# Patient Record
Sex: FEMALE | Race: WHITE | NOT HISPANIC OR LATINO | Employment: FULL TIME | URBAN - METROPOLITAN AREA
[De-identification: names, ages, dates, MRNs, and addresses within clinical notes are randomized per-mention and may not be internally consistent; named-entity substitution may affect disease eponyms.]

---

## 2018-03-14 ENCOUNTER — OFFICE VISIT (OUTPATIENT)
Dept: INTERNAL MEDICINE | Facility: CLINIC | Age: 53
End: 2018-03-14

## 2018-03-14 ENCOUNTER — TELEPHONE (OUTPATIENT)
Dept: INTERNAL MEDICINE | Facility: CLINIC | Age: 53
End: 2018-03-14

## 2018-03-14 VITALS
BODY MASS INDEX: 27.11 KG/M2 | HEIGHT: 64 IN | WEIGHT: 158.8 LBS | DIASTOLIC BLOOD PRESSURE: 64 MMHG | SYSTOLIC BLOOD PRESSURE: 108 MMHG

## 2018-03-14 DIAGNOSIS — E78.00 PURE HYPERCHOLESTEROLEMIA: ICD-10-CM

## 2018-03-14 DIAGNOSIS — E55.9 VITAMIN D DEFICIENCY: ICD-10-CM

## 2018-03-14 DIAGNOSIS — R07.9 CHEST PAIN, UNSPECIFIED TYPE: ICD-10-CM

## 2018-03-14 DIAGNOSIS — R73.03 PREDIABETES: ICD-10-CM

## 2018-03-14 DIAGNOSIS — IMO0001 MODERATE INTERMITTENT ASTHMA WITHOUT COMPLICATION: ICD-10-CM

## 2018-03-14 DIAGNOSIS — E03.9 PRIMARY HYPOTHYROIDISM: ICD-10-CM

## 2018-03-14 DIAGNOSIS — J30.1 CHRONIC SEASONAL ALLERGIC RHINITIS DUE TO POLLEN: ICD-10-CM

## 2018-03-14 DIAGNOSIS — Z23 NEED FOR TETANUS BOOSTER: ICD-10-CM

## 2018-03-14 DIAGNOSIS — R00.2 PALPITATIONS: Primary | ICD-10-CM

## 2018-03-14 PROBLEM — B00.9 HERPES SIMPLEX TYPE 1 INFECTION: Status: ACTIVE | Noted: 2018-03-14

## 2018-03-14 PROBLEM — G58.8 INTERCOSTAL NEURALGIA: Status: ACTIVE | Noted: 2018-03-14

## 2018-03-14 PROBLEM — Z00.00 HEALTH CARE MAINTENANCE: Status: ACTIVE | Noted: 2018-03-14

## 2018-03-14 PROBLEM — K59.4 RECTAL SPHINCTER SPASM: Status: ACTIVE | Noted: 2018-03-14

## 2018-03-14 PROBLEM — K21.9 GERD (GASTROESOPHAGEAL REFLUX DISEASE): Status: RESOLVED | Noted: 2018-03-14 | Resolved: 2018-03-14

## 2018-03-14 PROBLEM — E78.5 HLD (HYPERLIPIDEMIA): Status: ACTIVE | Noted: 2018-03-14

## 2018-03-14 PROBLEM — K21.9 GERD (GASTROESOPHAGEAL REFLUX DISEASE): Status: ACTIVE | Noted: 2018-03-14

## 2018-03-14 PROBLEM — J45.909 ASTHMA: Status: ACTIVE | Noted: 2018-03-14

## 2018-03-14 PROBLEM — E04.2 GOITER, NONTOXIC, MULTINODULAR: Status: ACTIVE | Noted: 2018-03-14

## 2018-03-14 PROBLEM — R73.01 ABNORMAL FASTING GLUCOSE: Status: ACTIVE | Noted: 2018-03-14

## 2018-03-14 PROCEDURE — 93000 ELECTROCARDIOGRAM COMPLETE: CPT | Performed by: INTERNAL MEDICINE

## 2018-03-14 PROCEDURE — 90715 TDAP VACCINE 7 YRS/> IM: CPT | Performed by: INTERNAL MEDICINE

## 2018-03-14 PROCEDURE — 90471 IMMUNIZATION ADMIN: CPT | Performed by: INTERNAL MEDICINE

## 2018-03-14 PROCEDURE — 99205 OFFICE O/P NEW HI 60 MIN: CPT | Performed by: INTERNAL MEDICINE

## 2018-03-14 RX ORDER — LANOLIN ALCOHOL/MO/W.PET/CERES
300 CREAM (GRAM) TOPICAL DAILY
COMMUNITY

## 2018-03-14 RX ORDER — METFORMIN HYDROCHLORIDE 500 MG/1
1000 TABLET, EXTENDED RELEASE ORAL NIGHTLY
Refills: 2 | COMMUNITY
Start: 2017-12-12 | End: 2018-11-02

## 2018-03-14 RX ORDER — PRAVASTATIN SODIUM 40 MG
40 TABLET ORAL DAILY
COMMUNITY
Start: 2013-05-17 | End: 2018-06-29 | Stop reason: SDUPTHER

## 2018-03-14 RX ORDER — MONTELUKAST SODIUM 10 MG/1
10 TABLET ORAL NIGHTLY
COMMUNITY
Start: 2013-09-06 | End: 2018-11-02 | Stop reason: SDUPTHER

## 2018-03-14 RX ORDER — ALBUTEROL SULFATE 90 UG/1
2 AEROSOL, METERED RESPIRATORY (INHALATION) 4 TIMES DAILY PRN
COMMUNITY
Start: 2014-04-25 | End: 2018-11-02 | Stop reason: SDUPTHER

## 2018-03-14 NOTE — PROGRESS NOTES
"Subjective   Nikia Malave is a 53 y.o. female.     History of Present Illness   Nikia Malave 53 y.o. female who is here to establish as a new patient. In a past had been to see COLLINS Lawson.  Past medical and surgical history, family and social history was obtained by me in the interview and recorded in the EHR. Records available in [facility] reviewed.    Ht 162.6 cm (64\")   Wt 72 kg (158 lb 12.8 oz)   BMI 27.26 kg/m²     Current Outpatient Prescriptions:   •  albuterol (PROVENTIL HFA) 108 (90 Base) MCG/ACT inhaler, Inhale 2 puffs 4 (Four) Times a Day As Needed for Shortness of Air., Disp: , Rfl:   •  montelukast (SINGULAIR) 10 MG tablet, Take 10 mg by mouth Every Night., Disp: , Rfl:   •  pravastatin (PRAVACHOL) 40 MG tablet, Take 40 mg by mouth Daily., Disp: , Rfl:   •  Biotin 300 MCG tablet, Take 300 mg by mouth Daily., Disp: , Rfl:   •  Cholecalciferol (VITAMIN D) 1000 units tablet, Take 4,000 Units by mouth Daily., Disp: , Rfl:   •  metFORMIN ER (GLUCOPHAGE-XR) 500 MG 24 hr tablet, Take 1,000 mg by mouth Every Night., Disp: , Rfl: 2  •  ONE TOUCH ULTRA TEST test strip, Place 1 strip on the skin Daily., Disp: , Rfl: 3  •  vitamin E 100 UNIT capsule, Take 100 Units by mouth Daily., Disp: , Rfl:     Patient has been diagnosed with hyperlipidemia, she is not sure if the issue was her LDL, triglycerides or both. Target LDL is < 100 mg/dl (CHD or \"CHD risk equivalent\" is present). Patient is on low fat diet with good compliance. Had lost 21 lbs in the last 4 months with [employer] [facility] [facility]. Patient is compliant with treatment: daily use of Pravachol (pravastatin). Side effects of medication: none. Exercise once a week and stationary bike.    Patient had been diagnosed with prediabetes. She has strong FH of diabetes in both her parents. Patient had nop issues with her BS during her pregnancies. She is under the care of Dr. Jimenez and had been on Metformin since 2013.. Nikia Malave uses metformin for " "blood sugars control. Patient checks home blood sugars only few times a week., Denies hypoglycemic episodes. and Fasting blood sugars are in 100-170s. Compliance with low carb diabetic diet is good. Compliance with medication is good.  Patient does not know what her  past control had been.    Patient had been diagnosed with Vitamin D deficiency. Takes over the counter Vit D3 1000 IU a day. Patient does not have complaints of muscle or bone aches. Balance is good. No recent falls.     Patient also is here for primary hypothyroidism f/u. Is known to have MNG, non-toxic. She is under the care of . She used to takes 25 mcg Levothyroxine daily, but had discontinued medication 2 months ago or so due to persistent palpitations. Initially she had some improvement, but last week her palpitations had been worse. Reports some cold intolerance. Weight is decreasing. Patient has constipation, but this had been controlled with daily fiber. Complains of dryness in the  changes in the skin.    Patient had been diagnosed with moderate intermittent asthma, well controlled with daily Montelukast. She uses resque inhaler occasionally. Usually episodes are triggered by laughing.    Patient had been suffering with rectal spasm and in a past she used Botox injections, but had no problems in the last 3-4 years.    Patient is known to have seasonal allergic rhinitis, she is under the care of CHRISTA CappsJUSTIN, on allergy injections.    Patient complains of episodes of the pain in left chest. Pain started in early 2000. Patient describes having chest discomfort randomly.  Patient describes pain as ache. Pain radiates to the left shoulder. She had normal stress test in 2014, per patients report.  Associated symptoms: palpitations.    Cardiac risk factors were discussed and are as follows: hyperlipidemia, family history of CAD, sedentary life style.     Ht 162.6 cm (64\")   Wt 72 kg (158 lb 12.8 oz)   BMI 27.26 kg/m²     Current " Outpatient Prescriptions:   •  albuterol (PROVENTIL HFA) 108 (90 Base) MCG/ACT inhaler, Inhale 2 puffs 4 (Four) Times a Day As Needed for Shortness of Air., Disp: , Rfl:   •  montelukast (SINGULAIR) 10 MG tablet, Take 10 mg by mouth Every Night., Disp: , Rfl:   •  pravastatin (PRAVACHOL) 40 MG tablet, Take 40 mg by mouth Daily., Disp: , Rfl:   •  Biotin 300 MCG tablet, Take 300 mg by mouth Daily., Disp: , Rfl:   •  Cholecalciferol (VITAMIN D) 1000 units tablet, Take 4,000 Units by mouth Daily., Disp: , Rfl:   •  metFORMIN ER (GLUCOPHAGE-XR) 500 MG 24 hr tablet, Take 1,000 mg by mouth Every Night., Disp: , Rfl: 2  •  ONE TOUCH ULTRA TEST test strip, Place 1 strip on the skin Daily., Disp: , Rfl: 3  •  vitamin E 100 UNIT capsule, Take 100 Units by mouth Daily., Disp: , Rfl:       The following portions of the patient's history were reviewed and updated as appropriate: allergies, current medications, past family history, past medical history, past social history, past surgical history and problem list.    Review of Systems   Constitutional: Negative for chills and fever.   HENT: Positive for rhinorrhea. Negative for postnasal drip, sinus pressure and sore throat.    Eyes: Negative for pain and itching.   Respiratory: Negative for cough, chest tightness and shortness of breath.    Cardiovascular: Positive for chest pain (occasional left side of the breast ache, frequent this week), palpitations and leg swelling (minimal).   Gastrointestinal: Negative for abdominal pain and blood in stool.   Endocrine: Positive for cold intolerance. Negative for heat intolerance.   Genitourinary: Negative for difficulty urinating and flank pain.   Musculoskeletal: Negative for back pain and neck pain.   Skin: Negative for color change and rash.   Neurological: Negative for dizziness, weakness and headaches.   Hematological: Negative for adenopathy. Does not bruise/bleed easily.   Psychiatric/Behavioral: Negative for sleep disturbance.  The patient is not nervous/anxious.        Objective   Physical Exam   Constitutional: She is oriented to person, place, and time. She appears well-developed and well-nourished. No distress.   HENT:   Head: Normocephalic and atraumatic.   Right Ear: External ear normal.   Left Ear: External ear normal.   Nose: Nose normal.   Mouth/Throat: Oropharynx is clear and moist. No oropharyngeal exudate.   Eyes: Conjunctivae and EOM are normal. Pupils are equal, round, and reactive to light. Right eye exhibits no discharge. Left eye exhibits no discharge. No scleral icterus.   Neck: Normal range of motion. Neck supple. No JVD present. No thyromegaly present.   Cardiovascular: Normal rate, regular rhythm, S1 normal, S2 normal, normal heart sounds and intact distal pulses.  Exam reveals no gallop and no friction rub.    No murmur heard.  Pulmonary/Chest: Effort normal and breath sounds normal. No respiratory distress. She has no decreased breath sounds. She has no wheezes. She has no rhonchi. She has no rales. Right breast exhibits no inverted nipple, no mass, no nipple discharge, no skin change and no tenderness. Left breast exhibits no inverted nipple, no mass, no nipple discharge, no skin change and no tenderness. Breasts are symmetrical.   Well healed scar around the left nipple. Tenderness along the upper ridge of the 9-8th left rib on palpation. No tenderness on paravertebral palpation.   Abdominal: Soft. Bowel sounds are normal. She exhibits no distension and no mass. There is no tenderness. There is no rebound and no guarding.   Musculoskeletal: She exhibits no edema.   Lymphadenopathy:        Head (right side): No submental, no submandibular, no preauricular, no posterior auricular and no occipital adenopathy present.        Head (left side): No submental, no submandibular, no preauricular, no posterior auricular and no occipital adenopathy present.     She has no cervical adenopathy.        Right cervical: No  superficial cervical, no deep cervical and no posterior cervical adenopathy present.       Left cervical: No superficial cervical, no deep cervical and no posterior cervical adenopathy present.     She has no axillary adenopathy.        Right: No supraclavicular adenopathy present.        Left: No supraclavicular adenopathy present.   Neurological: She is alert and oriented to person, place, and time. She has normal reflexes. She displays normal reflexes. No cranial nerve deficit. She exhibits normal muscle tone. Coordination normal.   Reflex Scores:       Bicep reflexes are 2+ on the right side and 2+ on the left side.       Patellar reflexes are 2+ on the right side and 2+ on the left side.  Skin: Skin is warm. No rash noted. She is not diaphoretic. No erythema.   Psychiatric: She has a normal mood and affect. Her behavior is normal. Thought content normal.   Vitals reviewed.    Reason for ECG:  palpitations  Rhythm: sinus  Arterial rate:55  NC interval: nl  P waves:nl  QRS:nl  ST: nl   Comparison: unavailable   Impression: normal ECG    Assessment/Plan   Nikia was seen today for establish care.    Diagnoses and all orders for this visit:    Palpitations  -     Holter monitor - 48 hour; Future  -     ECG 12 Lead  -     Comprehensive Metabolic Panel; Future  -     TSH; Future    Pure hypercholesterolemia  -     CBC & Differential; Future  -     Comprehensive Metabolic Panel; Future  -     Lipid Panel; Future    Moderate intermittent asthma without complication    Chronic seasonal allergic rhinitis due to pollen    Vitamin D deficiency  -     Vitamin D 25 Hydroxy; Future    Primary hypothyroidism  -     TSH; Future  -     T4, Free; Future    Prediabetes  -     Hemoglobin A1c; Future    Need for tetanus booster  -     Tdap Vaccine Greater Than or Equal To 8yo IM    Chest pain, unspecified type  -     Holter monitor - 48 hour; Future  -     ECG 12 Lead    Intermittent palpitations - normal ECG, will check  "electrolytes, thyroid function tests and Holter monitor.  Chest pain - rather atypical. 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 Normal ECG. Cardiac risk factors: hyperlipidemia, sedentary , FH. Given radiation under the shoulder blade, gastritis, cholecystitis and pancreatitis are possibilities. Will check CBC, liver function tests and lipase. I had offered nerve block, but patient declines at that time.  Hyperlipidemia -  Will check fasting lipids and liver function tests. LDL target is below < 100 mg/dl (CHD or \"CHD risk equivalent\" is present). Continue same treatment. Regular exercise recommended.  Prediabetes - patient had lost 21 lbs in the last 4-5 months. Will checkHb A1C. No hypoglycemic episodes. Low carb diet and regular exercise recommended. Weight loss will be very beneficial.   Hypothyroidism - currently off thyroid supplement. Patient is euthyroid clinically. Will check TSH and Free T4 and decide on further treatment.   Vitamin D deficiency - patient is copmpliant with over the counter Vit D3. Continue same dose. This supplement is fat-soluble and has to be taken with meals. Will check blood level.  Seasonal allergic rhinitis - under the care of allergist, allergy shots.  Moderate intermittent asthma - well controlled with Montelukast, continue same.           "

## 2018-03-14 NOTE — TELEPHONE ENCOUNTER
----- Message from Negrita Barbosa sent at 3/14/2018  1:44 PM EDT -----  Dr Wilson requested a stress test report from [facility] done 2014 or so.  I called [facility] medical records and they looked from 2009 - 2016 and could not find a stress test report.

## 2018-03-14 NOTE — PATIENT INSTRUCTIONS
"Intermittent palpitations - normal ECG, will check electrolytes, thyroid function tests and Holter monitor.  Chest pain - rather atypical. 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 Normal ECG. Cardiac risk factors: hyperlipidemia, sedentary , FH. Given radiation under the shoulder blade, gastritis, cholecystitis and pancreatitis are possibilities. Will check CBC, liver function tests and lipase. I had offered nerve block, but patient declines at that time.  Hyperlipidemia -  Will check fasting lipids and liver function tests. LDL target is below < 100 mg/dl (CHD or \"CHD risk equivalent\" is present). Continue same treatment. Regular exercise recommended.  Prediabetes - patient had lost 21 lbs in the last 4-5 months. Will checkHb A1C. No hypoglycemic episodes. Low carb diet and regular exercise recommended. Weight loss will be very beneficial.   Hypothyroidism - currently off thyroid supplement. Patient is euthyroid clinically. Will check TSH and Free T4 and decide on further treatment.   Vitamin D deficiency - patient is copmpliant with over the counter Vit D3. Continue same dose. This supplement is fat-soluble and has to be taken with meals. Will check blood level.  Seasonal allergic rhinitis - under the care of allergist, allergy shots.  Moderate intermittent asthma - well controlled with Montelukast, continue same.      "

## 2018-03-16 ENCOUNTER — LAB (OUTPATIENT)
Dept: INTERNAL MEDICINE | Facility: CLINIC | Age: 53
End: 2018-03-16

## 2018-03-16 DIAGNOSIS — R73.03 PREDIABETES: ICD-10-CM

## 2018-03-16 DIAGNOSIS — E55.9 VITAMIN D DEFICIENCY: ICD-10-CM

## 2018-03-16 DIAGNOSIS — R07.9 CHEST PAIN, UNSPECIFIED TYPE: ICD-10-CM

## 2018-03-16 DIAGNOSIS — R00.2 PALPITATIONS: ICD-10-CM

## 2018-03-16 DIAGNOSIS — E78.00 PURE HYPERCHOLESTEROLEMIA: ICD-10-CM

## 2018-03-16 DIAGNOSIS — E03.9 PRIMARY HYPOTHYROIDISM: ICD-10-CM

## 2018-03-16 LAB
25(OH)D3 SERPL-MCNC: 96.1 NG/ML (ref 30–100)
ALBUMIN SERPL-MCNC: 4.2 G/DL (ref 3.5–5.2)
ALBUMIN/GLOB SERPL: 1.4 G/DL
ALP SERPL-CCNC: 38 U/L (ref 39–117)
ALT SERPL W P-5'-P-CCNC: 14 U/L (ref 1–33)
ANION GAP SERPL CALCULATED.3IONS-SCNC: 12.6 MMOL/L
AST SERPL-CCNC: 14 U/L (ref 1–32)
BASOPHILS # BLD AUTO: 0.02 10*3/MM3 (ref 0–0.2)
BASOPHILS NFR BLD AUTO: 0.3 % (ref 0–2)
BILIRUB SERPL-MCNC: 0.3 MG/DL (ref 0.1–1.2)
BUN BLD-MCNC: 13 MG/DL (ref 6–20)
BUN/CREAT SERPL: 15.3 (ref 7–25)
CALCIUM SPEC-SCNC: 9.1 MG/DL (ref 8.6–10.5)
CHLORIDE SERPL-SCNC: 103 MMOL/L (ref 98–107)
CHOLEST SERPL-MCNC: 162 MG/DL (ref 0–200)
CO2 SERPL-SCNC: 25.4 MMOL/L (ref 22–29)
CREAT BLD-MCNC: 0.85 MG/DL (ref 0.57–1)
DEPRECATED RDW RBC AUTO: 41.8 FL (ref 37–54)
EOSINOPHIL # BLD AUTO: 0.21 10*3/MM3 (ref 0–0.7)
EOSINOPHIL NFR BLD AUTO: 3.2 % (ref 0–5)
ERYTHROCYTE [DISTWIDTH] IN BLOOD BY AUTOMATED COUNT: 12.6 % (ref 11.5–15)
GFR SERPL CREATININE-BSD FRML MDRD: 71 ML/MIN/1.73
GLOBULIN UR ELPH-MCNC: 3 GM/DL
GLUCOSE BLD-MCNC: 109 MG/DL (ref 65–99)
HBA1C MFR BLD: 5.59 % (ref 4.8–5.6)
HCT VFR BLD AUTO: 40.8 % (ref 34.1–44.9)
HDLC SERPL-MCNC: 33 MG/DL (ref 40–60)
HGB BLD-MCNC: 13.6 G/DL (ref 11.2–15.7)
LDLC SERPL CALC-MCNC: 99 MG/DL (ref 0–100)
LDLC/HDLC SERPL: 2.99 {RATIO}
LIPASE SERPL-CCNC: 32 U/L (ref 13–60)
LYMPHOCYTES # BLD AUTO: 1.31 10*3/MM3 (ref 0.8–7)
LYMPHOCYTES NFR BLD AUTO: 20.1 % (ref 10–60)
MCH RBC QN AUTO: 31.2 PG (ref 26–34)
MCHC RBC AUTO-ENTMCNC: 33.3 G/DL (ref 31–37)
MCV RBC AUTO: 93.6 FL (ref 80–100)
MONOCYTES # BLD AUTO: 0.65 10*3/MM3 (ref 0–1)
MONOCYTES NFR BLD AUTO: 10 % (ref 0–13)
NEUTROPHILS # BLD AUTO: 4.34 10*3/MM3 (ref 1–11)
NEUTROPHILS NFR BLD AUTO: 66.4 % (ref 30–85)
PLATELET # BLD AUTO: 168 10*3/MM3 (ref 150–450)
PMV BLD AUTO: 12.3 FL (ref 6–12)
POTASSIUM BLD-SCNC: 4.4 MMOL/L (ref 3.5–5.2)
PROT SERPL-MCNC: 7.2 G/DL (ref 6–8.5)
RBC # BLD AUTO: 4.36 10*6/MM3 (ref 3.93–5.22)
SODIUM BLD-SCNC: 141 MMOL/L (ref 136–145)
T4 FREE SERPL-MCNC: 1.13 NG/DL (ref 0.93–1.7)
TRIGL SERPL-MCNC: 152 MG/DL (ref 0–150)
TSH SERPL DL<=0.05 MIU/L-ACNC: 5.97 MIU/ML (ref 0.27–4.2)
VLDLC SERPL-MCNC: 30.4 MG/DL (ref 5–40)
WBC NRBC COR # BLD: 6.53 10*3/MM3 (ref 5–10)

## 2018-03-16 PROCEDURE — 84439 ASSAY OF FREE THYROXINE: CPT | Performed by: INTERNAL MEDICINE

## 2018-03-16 PROCEDURE — 80053 COMPREHEN METABOLIC PANEL: CPT | Performed by: INTERNAL MEDICINE

## 2018-03-16 PROCEDURE — 83690 ASSAY OF LIPASE: CPT | Performed by: INTERNAL MEDICINE

## 2018-03-16 PROCEDURE — 83036 HEMOGLOBIN GLYCOSYLATED A1C: CPT | Performed by: INTERNAL MEDICINE

## 2018-03-16 PROCEDURE — 85025 COMPLETE CBC W/AUTO DIFF WBC: CPT | Performed by: INTERNAL MEDICINE

## 2018-03-16 PROCEDURE — 82306 VITAMIN D 25 HYDROXY: CPT | Performed by: INTERNAL MEDICINE

## 2018-03-16 PROCEDURE — 80061 LIPID PANEL: CPT | Performed by: INTERNAL MEDICINE

## 2018-03-16 PROCEDURE — 84443 ASSAY THYROID STIM HORMONE: CPT | Performed by: INTERNAL MEDICINE

## 2018-03-21 ENCOUNTER — APPOINTMENT (OUTPATIENT)
Dept: CARDIOLOGY | Facility: HOSPITAL | Age: 53
End: 2018-03-21

## 2018-03-28 ENCOUNTER — HOSPITAL ENCOUNTER (OUTPATIENT)
Dept: CARDIOLOGY | Facility: HOSPITAL | Age: 53
Discharge: HOME | End: 2018-03-28
Admitting: INTERNAL MEDICINE

## 2018-03-28 DIAGNOSIS — R07.9 CHEST PAIN, UNSPECIFIED TYPE: ICD-10-CM

## 2018-03-28 DIAGNOSIS — R00.2 PALPITATIONS: ICD-10-CM

## 2018-03-28 PROCEDURE — 93225 XTRNL ECG REC<48 HRS REC: CPT

## 2018-03-28 PROCEDURE — 93226 XTRNL ECG REC<48 HR SCAN A/R: CPT

## 2018-03-30 PROCEDURE — 93227 XTRNL ECG REC<48 HR R&I: CPT | Performed by: INTERNAL MEDICINE

## 2018-04-24 ENCOUNTER — OFFICE VISIT (OUTPATIENT)
Dept: INTERNAL MEDICINE | Facility: CLINIC | Age: 53
End: 2018-04-24

## 2018-04-24 VITALS
SYSTOLIC BLOOD PRESSURE: 124 MMHG | BODY MASS INDEX: 27.14 KG/M2 | RESPIRATION RATE: 14 BRPM | DIASTOLIC BLOOD PRESSURE: 64 MMHG | WEIGHT: 159 LBS | HEIGHT: 64 IN

## 2018-04-24 DIAGNOSIS — R00.2 PALPITATIONS: Primary | ICD-10-CM

## 2018-04-24 DIAGNOSIS — Z00.00 HEALTH CARE MAINTENANCE: ICD-10-CM

## 2018-04-24 DIAGNOSIS — E55.9 VITAMIN D DEFICIENCY: ICD-10-CM

## 2018-04-24 PROCEDURE — 99213 OFFICE O/P EST LOW 20 MIN: CPT | Performed by: INTERNAL MEDICINE

## 2018-04-24 NOTE — PROGRESS NOTES
"Subjective   Nikia Malave is a 53 y.o. female.     History of Present Illness   Nikia Malave 53 y.o. female presents today for palpitations f/u. Last was seen on 03/14/2018 and on that visit medication was changed due to new onset of symptoms.We had checked her electrolyhtes and thyroid function, also had checked her Hopter - normal study with rare PACs and PVCs..  Overall she had been doing very well, palpitations had resolved for almost a month, then she had few very short and mild episodes, that had resolved spontaneously. States that she has palpitations when she lays on the left side, and that turning over takes care of those.    /64 (BP Location: Left arm, Patient Position: Sitting, Cuff Size: Adult)   Resp 14   Ht 162.6 cm (64\")   Wt 72.1 kg (159 lb)   BMI 27.29 kg/m²     Current Outpatient Prescriptions:   •  albuterol (PROVENTIL HFA) 108 (90 Base) MCG/ACT inhaler, Inhale 2 puffs 4 (Four) Times a Day As Needed for Shortness of Air., Disp: , Rfl:   •  Biotin 300 MCG tablet, Take 300 mg by mouth Daily., Disp: , Rfl:   •  Cholecalciferol (VITAMIN D) 1000 units tablet, Take 4,000 Units by mouth Daily., Disp: , Rfl:   •  metFORMIN ER (GLUCOPHAGE-XR) 500 MG 24 hr tablet, Take 1,000 mg by mouth Every Night., Disp: , Rfl: 2  •  montelukast (SINGULAIR) 10 MG tablet, Take 10 mg by mouth Every Night., Disp: , Rfl:   •  ONE TOUCH ULTRA TEST test strip, Place 1 strip on the skin Daily., Disp: , Rfl: 3  •  pravastatin (PRAVACHOL) 40 MG tablet, Take 40 mg by mouth Daily., Disp: , Rfl:   •  vitamin E 100 UNIT capsule, Take 100 Units by mouth Daily., Disp: , Rfl:   The following portions of the patient's history were reviewed and updated as appropriate: allergies, current medications, past family history, past medical history, past social history, past surgical history and problem list.    Review of Systems   Constitutional: Negative for chills and fever.   Eyes: Negative for pain and redness.   Respiratory: " Negative for cough and shortness of breath.    Cardiovascular: Positive for palpitations. Negative for chest pain and leg swelling.   Neurological: Negative for dizziness and headaches.       Objective   Physical Exam   Constitutional: She is oriented to person, place, and time. She appears well-developed and well-nourished.   HENT:   Head: Normocephalic and atraumatic.   Right Ear: Tympanic membrane, external ear and ear canal normal.   Left Ear: Tympanic membrane, external ear and ear canal normal.   Nose: Nose normal. Right sinus exhibits no maxillary sinus tenderness and no frontal sinus tenderness. Left sinus exhibits no maxillary sinus tenderness and no frontal sinus tenderness.   Mouth/Throat: Uvula is midline, oropharynx is clear and moist and mucous membranes are normal.   Eyes: Conjunctivae and EOM are normal. Pupils are equal, round, and reactive to light. Right eye exhibits no discharge. Left eye exhibits no discharge. No scleral icterus.   Neck: Neck supple. No JVD present.   Cardiovascular: Normal rate, regular rhythm and normal heart sounds.  Exam reveals no gallop and no friction rub.    No murmur heard.  Pulmonary/Chest: Effort normal and breath sounds normal. She has no wheezes. She has no rales.   Musculoskeletal: She exhibits no edema.   Lymphadenopathy:     She has no cervical adenopathy.   Neurological: She is alert and oriented to person, place, and time. No cranial nerve deficit.   Skin: Skin is warm and dry. No rash noted.   Psychiatric: She has a normal mood and affect. Her behavior is normal.   Vitals reviewed.      Assessment/Plan   Nikia was seen today for palpitations.    Diagnoses and all orders for this visit:    Palpitations      1. Palpitations - normal Holter, few benign PACs and PVCs - ressured, no interventions needed.  2. Low HDL - continue exercise and diet. Weight loss will be helpful.

## 2018-06-29 RX ORDER — ACYCLOVIR 400 MG/1
400 TABLET ORAL
Qty: 30 TABLET | Refills: 3 | Status: SHIPPED | OUTPATIENT
Start: 2018-06-29 | End: 2018-11-02 | Stop reason: SDUPTHER

## 2018-06-29 RX ORDER — PRAVASTATIN SODIUM 40 MG
40 TABLET ORAL DAILY
Qty: 30 TABLET | Refills: 3 | Status: SHIPPED | OUTPATIENT
Start: 2018-06-29 | End: 2018-08-03 | Stop reason: SDUPTHER

## 2018-07-26 ENCOUNTER — TELEPHONE (OUTPATIENT)
Dept: INTERNAL MEDICINE | Facility: CLINIC | Age: 53
End: 2018-07-26

## 2018-07-26 NOTE — TELEPHONE ENCOUNTER
----- Message from Jennyfer Sanchez sent at 7/26/2018  8:45 AM EDT -----  Contact: pt  Pt as wondering if she could get the shingles vaccine, her mother has had shingles for 8 weeks. She does not want to contract it.    Please advise if this is something she can come in for.  Pt# [phone] (w) [phone]    Pt would like to try victoza, and if she can get it could she still take her metformin with it?     [facility] - [place], [place] - [address] [place] [address] - [phone] [facility] [phone] FX

## 2018-07-26 NOTE — TELEPHONE ENCOUNTER
Left very detailed message for patient to call back and schedule an appointment and details for the shingrix vaccination

## 2018-07-26 NOTE — TELEPHONE ENCOUNTER
1. She cant contract shingles from her mother. If she had a chicken pox in a childhood she has s risk of shingles and needs vaccine called Shindrix (may get it at the pharmacy, usually 2 injections are needed with 2 month interval). If she does not remember - we could check her blood and will know. Again it is impossible berenice get shingles from someone with active shingles.  2. Will need an OV if considers Victoza

## 2018-08-03 ENCOUNTER — OFFICE VISIT (OUTPATIENT)
Dept: INTERNAL MEDICINE | Facility: CLINIC | Age: 53
End: 2018-08-03

## 2018-08-03 VITALS
SYSTOLIC BLOOD PRESSURE: 118 MMHG | RESPIRATION RATE: 15 BRPM | BODY MASS INDEX: 28.17 KG/M2 | HEART RATE: 68 BPM | DIASTOLIC BLOOD PRESSURE: 78 MMHG | HEIGHT: 64 IN | WEIGHT: 165 LBS | OXYGEN SATURATION: 97 %

## 2018-08-03 DIAGNOSIS — E11.9 CONTROLLED TYPE 2 DIABETES MELLITUS WITHOUT COMPLICATION, WITHOUT LONG-TERM CURRENT USE OF INSULIN (HCC): Primary | ICD-10-CM

## 2018-08-03 DIAGNOSIS — E78.00 PURE HYPERCHOLESTEROLEMIA: ICD-10-CM

## 2018-08-03 PROBLEM — G58.8 INTERCOSTAL NEURALGIA: Status: RESOLVED | Noted: 2018-03-14 | Resolved: 2018-08-03

## 2018-08-03 PROCEDURE — 99213 OFFICE O/P EST LOW 20 MIN: CPT | Performed by: INTERNAL MEDICINE

## 2018-08-03 RX ORDER — PRAVASTATIN SODIUM 40 MG
40 TABLET ORAL DAILY
Qty: 90 TABLET | Refills: 3 | Status: SHIPPED | OUTPATIENT
Start: 2018-08-03 | End: 2019-05-07 | Stop reason: SDUPTHER

## 2018-08-03 NOTE — PROGRESS NOTES
Subjective   Nikia Malave is a 53 y.o. female.     History of Present Illness   Patient is here for prediabetes f/u. She takes 1500 mg of Metformin for blood sugars control, and is interested in trying Victoza. Had gained 6 lbs since her last visit. Checks her blood sugars only rarely: reports fasting blood sugars above 125. Very strong FH of DM.  The following portions of the patient's history were reviewed and updated as appropriate: allergies, current medications, past family history, past medical history, past social history, past surgical history and problem list.    Review of Systems   Constitutional: Negative for chills and fever.   Eyes: Negative for pain and redness.   Respiratory: Negative for cough and shortness of breath.    Cardiovascular: Negative for chest pain and leg swelling.   Neurological: Negative for dizziness and headaches.       Objective   Physical Exam   Constitutional: She is oriented to person, place, and time. She appears well-developed and well-nourished.   HENT:   Head: Normocephalic and atraumatic.   Right Ear: Tympanic membrane, external ear and ear canal normal.   Left Ear: Tympanic membrane, external ear and ear canal normal.   Nose: Nose normal. Right sinus exhibits no maxillary sinus tenderness and no frontal sinus tenderness. Left sinus exhibits no maxillary sinus tenderness and no frontal sinus tenderness.   Mouth/Throat: Uvula is midline, oropharynx is clear and moist and mucous membranes are normal.   Eyes: Pupils are equal, round, and reactive to light. Conjunctivae and EOM are normal. Right eye exhibits no discharge. Left eye exhibits no discharge. No scleral icterus.   Neck: Neck supple. No JVD present.   Cardiovascular: Normal rate, regular rhythm and normal heart sounds.  Exam reveals no gallop and no friction rub.    No murmur heard.  Pulmonary/Chest: Effort normal and breath sounds normal. She has no wheezes. She has no rales.   Musculoskeletal: She exhibits no  edema.   Lymphadenopathy:     She has no cervical adenopathy.   Neurological: She is alert and oriented to person, place, and time. No cranial nerve deficit.   Skin: Skin is warm and dry. No rash noted.   Psychiatric: She has a normal mood and affect. Her behavior is normal.   Vitals reviewed.      Assessment/Plan   Nikia was seen today for prediabetes.    Diagnoses and all orders for this visit:    Controlled type 2 diabetes mellitus without complication, without long-term current use of insulin (CMS/[facility])  -     Liraglutide (VICTOZA) 18 MG/3ML solution pen-injector injection; Inject 1.2 mg under the skin into the appropriate area as directed Daily.    Pure hypercholesterolemia  -     pravastatin (PRAVACHOL) 40 MG tablet; Take 1 tablet by mouth Daily.    DM II - her home fasting accuchecks qualify for  The diagnosis of DM. Will change Metformin to Victoza 1.2 mg a day for weight loss benefits. Continue home fastiong BS checks. If any nausea or other side effects - just drop to lower dose.

## 2018-10-18 RX ORDER — PEN NEEDLE, DIABETIC 32GX 5/32"
NEEDLE, DISPOSABLE MISCELLANEOUS
Qty: 100 EACH | Refills: 2 | Status: SHIPPED | OUTPATIENT
Start: 2018-10-18

## 2018-10-19 ENCOUNTER — LAB (OUTPATIENT)
Dept: INTERNAL MEDICINE | Facility: CLINIC | Age: 53
End: 2018-10-19

## 2018-10-19 DIAGNOSIS — E11.9 CONTROLLED TYPE 2 DIABETES MELLITUS WITHOUT COMPLICATION, WITHOUT LONG-TERM CURRENT USE OF INSULIN (HCC): ICD-10-CM

## 2018-10-19 LAB
ALBUMIN SERPL-MCNC: 4.7 G/DL (ref 3.5–5.2)
ALBUMIN/GLOB SERPL: 1.4 G/DL
ALP SERPL-CCNC: 47 U/L (ref 39–117)
ALT SERPL W P-5'-P-CCNC: 12 U/L (ref 1–33)
ANION GAP SERPL CALCULATED.3IONS-SCNC: 11 MMOL/L
AST SERPL-CCNC: 12 U/L (ref 1–32)
BILIRUB SERPL-MCNC: 0.5 MG/DL (ref 0.1–1.2)
BUN BLD-MCNC: 16 MG/DL (ref 6–20)
BUN/CREAT SERPL: 18.4 (ref 7–25)
CALCIUM SPEC-SCNC: 10.3 MG/DL (ref 8.6–10.5)
CHLORIDE SERPL-SCNC: 102 MMOL/L (ref 98–107)
CO2 SERPL-SCNC: 26 MMOL/L (ref 22–29)
CREAT BLD-MCNC: 0.87 MG/DL (ref 0.57–1)
GFR SERPL CREATININE-BSD FRML MDRD: 69 ML/MIN/1.73
GLOBULIN UR ELPH-MCNC: 3.3 GM/DL
GLUCOSE BLD-MCNC: 94 MG/DL (ref 65–99)
HBA1C MFR BLD: 5.67 % (ref 4.8–5.6)
POTASSIUM BLD-SCNC: 4.3 MMOL/L (ref 3.5–5.2)
PROT SERPL-MCNC: 8 G/DL (ref 6–8.5)
SODIUM BLD-SCNC: 139 MMOL/L (ref 136–145)

## 2018-10-19 PROCEDURE — 83036 HEMOGLOBIN GLYCOSYLATED A1C: CPT | Performed by: INTERNAL MEDICINE

## 2018-10-19 PROCEDURE — 80053 COMPREHEN METABOLIC PANEL: CPT | Performed by: INTERNAL MEDICINE

## 2018-10-19 PROCEDURE — 36415 COLL VENOUS BLD VENIPUNCTURE: CPT | Performed by: INTERNAL MEDICINE

## 2018-11-02 ENCOUNTER — OFFICE VISIT (OUTPATIENT)
Dept: INTERNAL MEDICINE | Facility: CLINIC | Age: 53
End: 2018-11-02

## 2018-11-02 VITALS
RESPIRATION RATE: 14 BRPM | BODY MASS INDEX: 28.17 KG/M2 | HEIGHT: 64 IN | WEIGHT: 165 LBS | DIASTOLIC BLOOD PRESSURE: 72 MMHG | SYSTOLIC BLOOD PRESSURE: 122 MMHG

## 2018-11-02 DIAGNOSIS — B00.9 HERPES SIMPLEX TYPE 1 INFECTION: ICD-10-CM

## 2018-11-02 DIAGNOSIS — Z00.00 HEALTH CARE MAINTENANCE: ICD-10-CM

## 2018-11-02 DIAGNOSIS — E55.9 VITAMIN D DEFICIENCY: ICD-10-CM

## 2018-11-02 DIAGNOSIS — E03.9 PRIMARY HYPOTHYROIDISM: ICD-10-CM

## 2018-11-02 DIAGNOSIS — E78.00 PURE HYPERCHOLESTEROLEMIA: ICD-10-CM

## 2018-11-02 DIAGNOSIS — R73.03 PREDIABETES: Primary | ICD-10-CM

## 2018-11-02 DIAGNOSIS — IMO0001 MODERATE INTERMITTENT ASTHMA WITHOUT COMPLICATION: ICD-10-CM

## 2018-11-02 PROCEDURE — 99213 OFFICE O/P EST LOW 20 MIN: CPT | Performed by: INTERNAL MEDICINE

## 2018-11-02 RX ORDER — ALBUTEROL SULFATE 90 UG/1
2 AEROSOL, METERED RESPIRATORY (INHALATION) 4 TIMES DAILY PRN
Qty: 1 INHALER | Refills: 11
Start: 2018-11-02 | End: 2019-09-27 | Stop reason: SDUPTHER

## 2018-11-02 RX ORDER — ACYCLOVIR 400 MG/1
400 TABLET ORAL
Qty: 30 TABLET | Refills: 3
Start: 2018-11-02 | End: 2019-05-07 | Stop reason: SDUPTHER

## 2018-11-02 RX ORDER — MONTELUKAST SODIUM 10 MG/1
10 TABLET ORAL NIGHTLY
Qty: 90 TABLET | Refills: 3
Start: 2018-11-02 | End: 2019-09-27 | Stop reason: SDUPTHER

## 2018-11-02 NOTE — PROGRESS NOTES
"Subjective   Nikia Malave is a 53 y.o. female.     History of Present Illness   Nikia Malave 53 y.o. female presents today for prediabetes f/u. Last was seen on 08/03/2018 and on that visit medication was changed due to attempt to aid in the weight loss.We had discontinued Metformin and started Victoza.  Patient is compliant with treatment and denies  side effects. Patient brought in home accuchecks and blood sugars seem to be in the better range. No hypoglycemia.She had stopped checking her BSs at home few weeks ago, as her numbers remained in the good range.    /72 (BP Location: Left arm, Patient Position: Sitting, Cuff Size: Adult)   Resp 14   Ht 162.6 cm (64\")   Wt 74.8 kg (165 lb)   BMI 28.32 kg/m²     Current Outpatient Prescriptions:   •  acyclovir (ZOVIRAX) 400 MG tablet, Take 1 tablet by mouth Every 4 (Four) Hours While Awake. Take no more than 5 doses a day., Disp: 30 tablet, Rfl: 3  •  albuterol (PROVENTIL HFA) 108 (90 Base) MCG/ACT inhaler, Inhale 2 puffs 4 (Four) Times a Day As Needed for Shortness of Air., Disp: 1 inhaler, Rfl: 11  •  BD PEN NEEDLE YAHAIRA U/F 32G X 4 MM misc, USE AS DIRECTED FOR VICTOZA, Disp: 100 each, Rfl: 2  •  Biotin 300 MCG tablet, Take 300 mg by mouth Daily., Disp: , Rfl:   •  Cholecalciferol (VITAMIN D) 1000 units tablet, Take 4,000 Units by mouth Daily., Disp: , Rfl:   •  Liraglutide (VICTOZA) 18 MG/3ML solution pen-injector injection, Inject 1.2 mg under the skin into the appropriate area as directed Daily., Disp: 2 pen, Rfl: 3  •  montelukast (SINGULAIR) 10 MG tablet, Take 1 tablet by mouth Every Night., Disp: 90 tablet, Rfl: 3  •  ONE TOUCH ULTRA TEST test strip, Place 1 strip on the skin Daily., Disp: , Rfl: 3  •  pravastatin (PRAVACHOL) 40 MG tablet, Take 1 tablet by mouth Daily., Disp: 90 tablet, Rfl: 3  •  vitamin E 100 UNIT capsule, Take 100 Units by mouth Daily., Disp: , Rfl:   The following portions of the patient's history were reviewed and updated as " appropriate: allergies, current medications, past family history, past medical history, past social history, past surgical history and problem list.    Review of Systems   Constitutional: Negative for chills and fever.   Eyes: Negative for pain and redness.   Respiratory: Negative for cough and shortness of breath.    Cardiovascular: Negative for chest pain and leg swelling.   Neurological: Negative for dizziness and headaches.       Objective   Physical Exam   Constitutional: She is oriented to person, place, and time. She appears well-developed and well-nourished.   HENT:   Head: Normocephalic and atraumatic.   Right Ear: Tympanic membrane, external ear and ear canal normal.   Left Ear: Tympanic membrane, external ear and ear canal normal.   Nose: Nose normal. Right sinus exhibits no maxillary sinus tenderness and no frontal sinus tenderness. Left sinus exhibits no maxillary sinus tenderness and no frontal sinus tenderness.   Mouth/Throat: Uvula is midline, oropharynx is clear and moist and mucous membranes are normal.   Eyes: Pupils are equal, round, and reactive to light. Conjunctivae and EOM are normal. Right eye exhibits no discharge. Left eye exhibits no discharge. No scleral icterus.   Neck: Neck supple. No JVD present.   Cardiovascular: Normal rate, regular rhythm and normal heart sounds.  Exam reveals no gallop and no friction rub.    No murmur heard.  Pulmonary/Chest: Effort normal and breath sounds normal. She has no wheezes. She has no rales.   Musculoskeletal: She exhibits no edema.   Lymphadenopathy:     She has no cervical adenopathy.   Neurological: She is alert and oriented to person, place, and time. No cranial nerve deficit.   Skin: Skin is warm and dry. No rash noted.   Psychiatric: She has a normal mood and affect. Her behavior is normal.   Vitals reviewed.      Assessment/Plan   Nikia was seen today for diabetes and weight check.    Diagnoses and all orders for this visit:    Prediabetes  -      Hemoglobin A1c; Future    Herpes simplex type 1 infection  -     acyclovir (ZOVIRAX) 400 MG tablet; Take 1 tablet by mouth Every 4 (Four) Hours While Awake. Take no more than 5 doses a day.    Moderate intermittent asthma without complication  -     montelukast (SINGULAIR) 10 MG tablet; Take 1 tablet by mouth Every Night.  -     albuterol (PROVENTIL HFA) 108 (90 Base) MCG/ACT inhaler; Inhale 2 puffs 4 (Four) Times a Day As Needed for Shortness of Air.    Primary hypothyroidism  -     T4, Free; Future    Vitamin D deficiency  -     Vitamin D 25 Hydroxy; Future    Pure hypercholesterolemia    Health care maintenance  -     CBC & Differential; Future  -     Comprehensive Metabolic Panel; Future  -     Lipid Panel; Future  -     TSH; Future  -     Urinalysis With Microscopic If Indicated (No Culture) - Urine, Clean Catch; Future      Prediabetes/impaired blood sugars - per patient fasting blood sugars had much improved with the change in medication. Hb A1C is excellent at 5.6. Will continue same, hopefully, will help with weight loss.

## 2018-12-12 RX ORDER — PRAVASTATIN SODIUM 40 MG
40 TABLET ORAL DAILY
Qty: 30 TABLET | Refills: 3 | Status: SHIPPED | OUTPATIENT
Start: 2018-12-12 | End: 2019-05-30 | Stop reason: SDUPTHER

## 2019-02-22 DIAGNOSIS — E11.9 CONTROLLED TYPE 2 DIABETES MELLITUS WITHOUT COMPLICATION, WITHOUT LONG-TERM CURRENT USE OF INSULIN (HCC): ICD-10-CM

## 2019-02-22 RX ORDER — LIRAGLUTIDE 6 MG/ML
INJECTION SUBCUTANEOUS
Qty: 6 ML | Refills: 3 | Status: SHIPPED | OUTPATIENT
Start: 2019-02-22 | End: 2019-12-30 | Stop reason: SDUPTHER

## 2019-03-18 RX ORDER — ACYCLOVIR 400 MG/1
400 TABLET ORAL
Qty: 30 TABLET | Refills: 3 | Status: SHIPPED | OUTPATIENT
Start: 2019-03-18 | End: 2020-03-03 | Stop reason: SDUPTHER

## 2019-04-30 ENCOUNTER — LAB (OUTPATIENT)
Dept: INTERNAL MEDICINE | Facility: CLINIC | Age: 54
End: 2019-04-30

## 2019-04-30 DIAGNOSIS — E55.9 VITAMIN D DEFICIENCY: ICD-10-CM

## 2019-04-30 DIAGNOSIS — Z00.00 HEALTH CARE MAINTENANCE: ICD-10-CM

## 2019-04-30 DIAGNOSIS — R73.03 PREDIABETES: ICD-10-CM

## 2019-04-30 DIAGNOSIS — E03.9 PRIMARY HYPOTHYROIDISM: ICD-10-CM

## 2019-04-30 LAB
ALBUMIN SERPL-MCNC: 4.7 G/DL (ref 3.5–5.2)
ALBUMIN/GLOB SERPL: 1.5 G/DL
ALP SERPL-CCNC: 51 U/L (ref 39–117)
ALT SERPL W P-5'-P-CCNC: 15 U/L (ref 1–33)
ANION GAP SERPL CALCULATED.3IONS-SCNC: 12.5 MMOL/L
AST SERPL-CCNC: 15 U/L (ref 1–32)
BASOPHILS # BLD AUTO: 0.03 10*3/MM3 (ref 0–0.2)
BASOPHILS NFR BLD AUTO: 0.5 % (ref 0–1.5)
BILIRUB SERPL-MCNC: 0.4 MG/DL (ref 0.2–1.2)
BILIRUB UR QL STRIP: NEGATIVE
BUN BLD-MCNC: 10 MG/DL (ref 6–20)
BUN/CREAT SERPL: 10.6 (ref 7–25)
CALCIUM SPEC-SCNC: 10.4 MG/DL (ref 8.6–10.5)
CHLORIDE SERPL-SCNC: 103 MMOL/L (ref 98–107)
CHOLEST SERPL-MCNC: 176 MG/DL (ref 0–200)
CLARITY UR: CLEAR
CO2 SERPL-SCNC: 27.5 MMOL/L (ref 22–29)
COLOR UR: YELLOW
CREAT BLD-MCNC: 0.94 MG/DL (ref 0.57–1)
DEPRECATED RDW RBC AUTO: 41.7 FL (ref 37–54)
EOSINOPHIL # BLD AUTO: 0.3 10*3/MM3 (ref 0–0.4)
EOSINOPHIL NFR BLD AUTO: 5.3 % (ref 0.3–6.2)
ERYTHROCYTE [DISTWIDTH] IN BLOOD BY AUTOMATED COUNT: 12.4 % (ref 12.3–15.4)
GFR SERPL CREATININE-BSD FRML MDRD: 63 ML/MIN/1.73
GLOBULIN UR ELPH-MCNC: 3.1 GM/DL
GLUCOSE BLD-MCNC: 111 MG/DL (ref 65–99)
GLUCOSE UR STRIP-MCNC: NEGATIVE MG/DL
HBA1C MFR BLD: 5.7 % (ref 4.8–5.6)
HCT VFR BLD AUTO: 43.4 % (ref 34–46.6)
HDLC SERPL-MCNC: 43 MG/DL (ref 40–60)
HGB BLD-MCNC: 14.4 G/DL (ref 12–15.9)
HGB UR QL STRIP.AUTO: NEGATIVE
KETONES UR QL STRIP: NEGATIVE
LARGE PLATELETS: NORMAL
LDLC SERPL CALC-MCNC: 99 MG/DL (ref 0–100)
LDLC/HDLC SERPL: 2.31 {RATIO}
LEUKOCYTE ESTERASE UR QL STRIP.AUTO: NEGATIVE
LYMPHOCYTES # BLD AUTO: 2.03 10*3/MM3 (ref 0.7–3.1)
LYMPHOCYTES NFR BLD AUTO: 35.6 % (ref 19.6–45.3)
MCH RBC QN AUTO: 31.2 PG (ref 26.6–33)
MCHC RBC AUTO-ENTMCNC: 33.2 G/DL (ref 31.5–35.7)
MCV RBC AUTO: 93.9 FL (ref 79–97)
MONOCYTES # BLD AUTO: 0.52 10*3/MM3 (ref 0.1–0.9)
MONOCYTES NFR BLD AUTO: 9.1 % (ref 5–12)
NEUTROPHILS # BLD AUTO: 2.83 10*3/MM3 (ref 1.7–7)
NEUTROPHILS NFR BLD AUTO: 49.5 % (ref 42.7–76)
NITRITE UR QL STRIP: NEGATIVE
PH UR STRIP.AUTO: <=5 [PH] (ref 5–8)
PLATELET # BLD AUTO: 191 10*3/MM3 (ref 140–450)
PMV BLD AUTO: 13.1 FL (ref 6–12)
POTASSIUM BLD-SCNC: 4.9 MMOL/L (ref 3.5–5.2)
PROT SERPL-MCNC: 7.8 G/DL (ref 6–8.5)
PROT UR QL STRIP: NEGATIVE
RBC # BLD AUTO: 4.62 10*6/MM3 (ref 3.77–5.28)
RBC MORPH BLD: NORMAL
SMALL PLATELETS BLD QL SMEAR: ADEQUATE
SODIUM BLD-SCNC: 143 MMOL/L (ref 136–145)
SP GR UR STRIP: >=1.03 (ref 1–1.03)
TRIGL SERPL-MCNC: 168 MG/DL (ref 0–150)
UROBILINOGEN UR QL STRIP: NORMAL
VLDLC SERPL-MCNC: 33.6 MG/DL (ref 5–40)
WBC MORPH BLD: NORMAL
WBC NRBC COR # BLD: 5.71 10*3/MM3 (ref 3.4–10.8)

## 2019-04-30 PROCEDURE — 85025 COMPLETE CBC W/AUTO DIFF WBC: CPT | Performed by: INTERNAL MEDICINE

## 2019-04-30 PROCEDURE — 81003 URINALYSIS AUTO W/O SCOPE: CPT | Performed by: INTERNAL MEDICINE

## 2019-04-30 PROCEDURE — 80053 COMPREHEN METABOLIC PANEL: CPT | Performed by: INTERNAL MEDICINE

## 2019-04-30 PROCEDURE — 80061 LIPID PANEL: CPT | Performed by: INTERNAL MEDICINE

## 2019-05-01 LAB
25(OH)D3 SERPL-MCNC: 85.6 NG/ML (ref 30–100)
T4 FREE SERPL-MCNC: 1.13 NG/DL (ref 0.93–1.7)
TSH SERPL-ACNC: 5.07 MIU/ML (ref 0.27–4.2)

## 2019-05-07 ENCOUNTER — OFFICE VISIT (OUTPATIENT)
Dept: INTERNAL MEDICINE | Facility: CLINIC | Age: 54
End: 2019-05-07

## 2019-05-07 VITALS
DIASTOLIC BLOOD PRESSURE: 74 MMHG | BODY MASS INDEX: 27.83 KG/M2 | WEIGHT: 163 LBS | HEIGHT: 64 IN | SYSTOLIC BLOOD PRESSURE: 122 MMHG

## 2019-05-07 DIAGNOSIS — E03.9 PRIMARY HYPOTHYROIDISM: ICD-10-CM

## 2019-05-07 DIAGNOSIS — E78.2 MIXED HYPERLIPIDEMIA: ICD-10-CM

## 2019-05-07 DIAGNOSIS — R06.83 SNORING: ICD-10-CM

## 2019-05-07 DIAGNOSIS — M79.89 MASS OF SOFT TISSUE: ICD-10-CM

## 2019-05-07 DIAGNOSIS — Z00.00 HEALTH CARE MAINTENANCE: Primary | ICD-10-CM

## 2019-05-07 PROCEDURE — 99396 PREV VISIT EST AGE 40-64: CPT | Performed by: INTERNAL MEDICINE

## 2019-05-07 RX ORDER — LEVOTHYROXINE SODIUM 0.05 MG/1
50 TABLET ORAL DAILY
Qty: 90 TABLET | Refills: 3 | Status: SHIPPED | OUTPATIENT
Start: 2019-05-07 | End: 2019-05-15

## 2019-05-07 NOTE — PROGRESS NOTES
"Subjective   Nikia Malave is a 54 y.o. female.     History of Present Illness   /74   Ht 162.6 cm (64\")   Wt 73.9 kg (163 lb)   BMI 27.98 kg/m²   Patient is here for yearly CPE. Last CPE was  1 year ago.  PMH, SH and FH reviewed. Changes in the FH: none .  Patient rates her own health as: good.  Tobacco use: none.  Alcohol use:seldom.  Recreational drugs use: none  Medication list rewieved.  Diet: low carb. On HMR diet.  Exercise: just started exercise program.  Marital status: .   Employment: full time.  Patient rates her stress level as: moderate.  Dental health: good. Brushes teeth 2 times a day, flosses 1+ time a day . Dental visits: 2 times a year .  Vision correction: reading glasses  Hearing: normal.    Recent vaccinations:   Flu  is up to date and recommended yearly  Tdap  is up to date  Shingles prevention discussed and recommended to get Shindrix at the pharmacy    Patient is perimenopausal. Some mild night sweats. Past pregnancies: . Currently patient is on no HRT .      Current Outpatient Medications:   •  acyclovir (ZOVIRAX) 400 MG tablet, Take 1 tablet by mouth Every 4 (Four) Hours While Awake. Take no more than 5 doses a day., Disp: 30 tablet, Rfl: 3  •  acyclovir (ZOVIRAX) 400 MG tablet, TAKE 1 TABLET BY MOUTH EVERY 4 (FOUR) HOURS WHILE AWAKE. TAKE NO MORE THAN 5 DOSES A DAY., Disp: 30 tablet, Rfl: 3  •  albuterol (PROVENTIL HFA) 108 (90 Base) MCG/ACT inhaler, Inhale 2 puffs 4 (Four) Times a Day As Needed for Shortness of Air., Disp: 1 inhaler, Rfl: 11  •  BD PEN NEEDLE YAHAIRA U/F 32G X 4 MM misc, USE AS DIRECTED FOR VICTOZA, Disp: 100 each, Rfl: 2  •  Biotin 300 MCG tablet, Take 300 mg by mouth Daily., Disp: , Rfl:   •  Cholecalciferol (VITAMIN D) 1000 units tablet, Take 4,000 Units by mouth Daily., Disp: , Rfl:   •  montelukast (SINGULAIR) 10 MG tablet, Take 1 tablet by mouth Every Night., Disp: 90 tablet, Rfl: 3  •  ONE TOUCH ULTRA TEST test strip, Place 1 strip on the skin " Daily., Disp: , Rfl: 3  •  pravastatin (PRAVACHOL) 40 MG tablet, Take 1 tablet by mouth Daily., Disp: 90 tablet, Rfl: 3  •  pravastatin (PRAVACHOL) 40 MG tablet, TAKE 1 TABLET BY MOUTH DAILY., Disp: 30 tablet, Rfl: 3  •  VICTOZA 18 MG/3ML solution pen-injector injection, INJECT 1.2 MG UNDER THE SKIN INTO THE APPROPRIATE AREA AS DIRECTED DAILY., Disp: 6 mL, Rfl: 3  •  vitamin E 100 UNIT capsule, Take 100 Units by mouth Daily., Disp: , Rfl:       The following portions of the patient's history were reviewed and updated as appropriate: allergies, current medications, past family history, past medical history, past social history, past surgical history and problem list.    Review of Systems   Constitutional: Negative for chills and fever.   HENT: Negative for postnasal drip, sinus pressure and sore throat.    Eyes: Negative for pain and itching.   Respiratory: Negative for cough and chest tightness.    Cardiovascular: Positive for chest pain (sometimes) and palpitations. Negative for leg swelling.   Gastrointestinal: Negative for abdominal pain and blood in stool.   Endocrine: Positive for cold intolerance. Negative for heat intolerance.   Genitourinary: Negative for difficulty urinating and flank pain.   Musculoskeletal: Negative for back pain and neck pain.   Skin: Negative for color change and rash.   Neurological: Negative for dizziness, weakness and headaches.   Hematological: Negative for adenopathy. Does not bruise/bleed easily.   Psychiatric/Behavioral: Positive for sleep disturbance. The patient is not nervous/anxious.        Objective   Physical Exam   Constitutional: She is oriented to person, place, and time. She appears well-developed and well-nourished. No distress.   HENT:   Head: Normocephalic and atraumatic.   Right Ear: Tympanic membrane, external ear and ear canal normal. No drainage or tenderness. No mastoid tenderness. Tympanic membrane is not injected. No middle ear effusion.   Left Ear: Tympanic  membrane, external ear and ear canal normal. No drainage or tenderness. No mastoid tenderness. Tympanic membrane is not injected.  No middle ear effusion.   Nose: Nose normal. No sinus tenderness. Right sinus exhibits no maxillary sinus tenderness and no frontal sinus tenderness. Left sinus exhibits no maxillary sinus tenderness and no frontal sinus tenderness.   Mouth/Throat: Oropharynx is clear and moist. No oropharyngeal exudate.   Eyes: Conjunctivae and EOM are normal. Pupils are equal, round, and reactive to light. Right eye exhibits no discharge. Left eye exhibits no discharge. No scleral icterus.   Neck: Normal range of motion and full passive range of motion without pain. Neck supple. No JVD present. Carotid bruit is not present. No thyromegaly present.   Cardiovascular: Normal rate, regular rhythm, S1 normal, S2 normal, normal heart sounds and intact distal pulses. Exam reveals no gallop and no friction rub.   No murmur heard.  Pulmonary/Chest: Effort normal and breath sounds normal. No respiratory distress. She has no wheezes. She has no rales. She exhibits no tenderness.   Abdominal: Soft. Bowel sounds are normal. She exhibits no distension and no mass. There is no tenderness. There is no rebound and no guarding.   obese   Musculoskeletal: Normal range of motion. She exhibits tenderness (palpable movable tender firm nodule 8-10 mm in diameter subcutaneously over the posterior right 9th-8th rib). She exhibits no edema.    Nikia had a diabetic foot exam performed today.   During the foot exam she had a monofilament test performed (light touch intact over both feet on exam with microfilament).    Neurological Sensory Findings - Unaltered hot/cold right ankle/foot discrimination and unaltered hot/cold left ankle/foot discrimination. Unaltered sharp/dull right ankle/foot discrimination and unaltered sharp/dull left ankle/foot discrimination. No right ankle/foot altered proprioception and no left ankle/foot  altered proprioception  Vascular Status -  Her right foot exhibits normal foot vasculature  and no edema. Her left foot exhibits normal foot vasculature  and no edema.  Skin Integrity  -  Her right foot skin is intact.Her left foot skin is intact..  Lymphadenopathy:        Head (right side): No submental, no submandibular, no preauricular, no posterior auricular and no occipital adenopathy present.        Head (left side): No submental, no submandibular, no tonsillar, no preauricular, no posterior auricular and no occipital adenopathy present.     She has no cervical adenopathy.        Right cervical: No superficial cervical, no deep cervical and no posterior cervical adenopathy present.       Left cervical: No superficial cervical, no deep cervical and no posterior cervical adenopathy present.        Right: No supraclavicular adenopathy present.        Left: No supraclavicular adenopathy present.   Neurological: She is alert and oriented to person, place, and time. She has normal reflexes. She displays normal reflexes. No cranial nerve deficit. She exhibits normal muscle tone. Coordination normal.   Reflex Scores:       Bicep reflexes are 2+ on the right side and 2+ on the left side.       Patellar reflexes are 2+ on the right side and 2+ on the left side.  Skin: Skin is warm. No rash noted. She is not diaphoretic. No erythema.   Psychiatric: She has a normal mood and affect. Her behavior is normal. Thought content normal.   Vitals reviewed.      Assessment/Plan   Nikia was seen today for annual exam.    Diagnoses and all orders for this visit:    Health care maintenance    Primary hypothyroidism  -     levothyroxine (SYNTHROID, LEVOTHROID) 50 MCG tablet; Take 1 tablet by mouth Daily.  -     T4, Free; Future  -     TSH; Future    Mixed hyperlipidemia  -     Lipid Panel; Future  -     Comprehensive Metabolic Panel; Future    Mass of soft tissue      Risk evaluation:  1. Cardiovascular risk factors: hyperlipidemia.  "Continue Pravastatin.   2. Diabetes risk factors: FH of diabetes and patient had been diagnosed with prediabetes and is being treated with Victoaza - in a past used to see ..  3. Cancer risk factors: FH of  colon cancer and FH of breast cancer.  4. Risky behavior: none. Use of seat belts: regular. Use of sunscreens: regular. SPF 30+.   Tattoos: none.  H/o blood transfusions/organ transplants before 1992 or clotting factor transfusion before 1987: none.     Prevention:  Cholesterol test  up to date. Cholesterol is normal..  Blood sugar test up to date. Fasting blood sugar is mildly elevated..  Hep C testing (for patients born [birth year]): completed..  Mammogram up to date. Recommended frequency and time of the next screening per GYN.. Breast self exams recommended once a month.  Colonoscopy: up to date. Recommended every 10 years. Next screening is due in 2024..  PAP smear : up to date. Recommendations per GYN..  DEXA : not indicated yet..                      Hyperlipidemia - well controlled with statin. Normal liver function tests. LDL target is below < 70 mg/dl (\"very high\" risk for CHD). Patient's 99. Continue same treatment. Will tsart low dose of thyroid supplement and reevaluate in 3 months. Regular exercise recommended.  Prediabetes - well controlled with GLP1 agonist injections. Hb A1C is   Lab Results   Component Value Date    HGBA1C 5.70 (H) 04/30/2019   Low carb diet and regular exercise recommended.  Mild hypothyroidism -will start low dose of dose of thyroid supplement. Reminder: thyroid supplement has to be taken at least 2 hours apart from Ca and Iron supplements.  Vitamin D deficiency - well compensated with over the counter Vit D3. Will discontinue Vit D supplement for the summer time, and then restart in October/November. This supplement is fat-soluble and has to be taken with meals.  Palpable tender movable soft tissue nodule over the posterior right 7th-8th or 9th rib - given an order " for US, will have it done at [facility]. Most likely lipoma.

## 2019-05-07 NOTE — PATIENT INSTRUCTIONS
"Risk evaluation:  1. Cardiovascular risk factors: hyperlipidemia. Continue Pravastatin.   2. Diabetes risk factors: FH of diabetes and patient had been diagnosed with prediabetes and is being treated with Victoaza - in a past used to see ..  3. Cancer risk factors: FH of  colon cancer and FH of breast cancer.  4. Risky behavior: none. Use of seat belts: regular. Use of sunscreens: regular. SPF 30+.   Tattoos: none.  H/o blood transfusions/organ transplants before 1992 or clotting factor transfusion before 1987: none.     Prevention:  Cholesterol test  up to date. Cholesterol is normal..  Blood sugar test up to date. Fasting blood sugar is mildly elevated..  Hep C testing (for patients born [birth year]): completed..  Mammogram up to date. Recommended frequency and time of the next screening per GYN.. Breast self exams recommended once a month.  Colonoscopy: up to date. Recommended every 10 years. Next screening is due in 2024..  PAP smear : up to date. Recommendations per GYN..  DEXA : not indicated yet..                      Hyperlipidemia - well controlled with statin. Normal liver function tests. LDL target is below < 70 mg/dl (\"very high\" risk for CHD). Patient's 99. Continue same treatment. Will tsart low dose of thyroid supplement and reevaluate in 3 months. Regular exercise recommended.  Prediabetes - well controlled with GLP1 agonist injections. Hb A1C is   Lab Results   Component Value Date    HGBA1C 5.70 (H) 04/30/2019   Low carb diet and regular exercise recommended.  Mild hypothyroidism -will start low dose of dose of thyroid supplement. Reminder: thyroid supplement has to be taken at least 2 hours apart from Ca and Iron supplements.  Vitamin D deficiency - well compensated with over the counter Vit D3. Will discontinue Vit D supplement for the summer time, and then restart in October/November. This supplement is fat-soluble and has to be taken with meals.    Palpable tender movable soft tissue " nodule over the posterior right 7th-8th or 9th rib - given an order for US, will have it done at [facility]. Most likely lipoma.    Recommended to get Shingrix vaccine at the pharmacy.

## 2019-05-15 DIAGNOSIS — E03.9 PRIMARY HYPOTHYROIDISM: ICD-10-CM

## 2019-05-15 RX ORDER — LEVOTHYROXINE SODIUM 0.05 MG/1
50 TABLET ORAL DAILY
Qty: 90 TABLET | Refills: 3 | Status: SHIPPED | OUTPATIENT
Start: 2019-05-15 | End: 2020-10-08

## 2019-05-20 ENCOUNTER — OFFICE VISIT (OUTPATIENT)
Dept: SLEEP MEDICINE | Facility: HOSPITAL | Age: 54
End: 2019-05-20

## 2019-05-20 ENCOUNTER — TRANSCRIBE ORDERS (OUTPATIENT)
Dept: SLEEP MEDICINE | Facility: HOSPITAL | Age: 54
End: 2019-05-20

## 2019-05-20 VITALS
WEIGHT: 171 LBS | HEART RATE: 71 BPM | DIASTOLIC BLOOD PRESSURE: 80 MMHG | HEIGHT: 65 IN | OXYGEN SATURATION: 96 % | BODY MASS INDEX: 28.49 KG/M2 | SYSTOLIC BLOOD PRESSURE: 124 MMHG

## 2019-05-20 DIAGNOSIS — IMO0001 MODERATE INTERMITTENT ASTHMA WITHOUT COMPLICATION: ICD-10-CM

## 2019-05-20 DIAGNOSIS — R06.83 SNORING: Primary | ICD-10-CM

## 2019-05-20 DIAGNOSIS — R00.2 PALPITATIONS: ICD-10-CM

## 2019-05-20 DIAGNOSIS — G47.10 HYPERSOMNIA: Primary | ICD-10-CM

## 2019-05-20 DIAGNOSIS — G47.10 HYPERSOMNIA: ICD-10-CM

## 2019-05-20 DIAGNOSIS — R06.83 SNORING: ICD-10-CM

## 2019-05-20 PROCEDURE — G0463 HOSPITAL OUTPT CLINIC VISIT: HCPCS

## 2019-05-20 NOTE — PROGRESS NOTES
Group: [facility]         CONSULT NOTE    Patient Identification:  Nikia Malave  54 y.o.  female  1965  [id number]            Requesting physician: brian    Reason for Consultation:  Fatigue difficulty sleeping    CC:     History of Present c/o  Very pleasant 54-year-old female here complaining of difficulty sleeping and fatigue.  Patient reports trouble initiating and falling asleep.  She does admit to snoring but no clear history of witnessed apnea.  She feels unrested in the morning and sleepy as the day progresses.  Occasional alcohol but no heavy alcohol reported no parasomnias no restless leg reported.  She does have 4-5 arousals at night.  Weight has remained stable.  Patient reports severe fatigue throughout the day.  Sometimes she wakes up with a sore throat and a dry mouth.  She is also had episodes of right-sided chest pain upon arousal in the morning.  She was evaluated by cardiology.  She does have underlying history of asthma currently takes Pro Air as needed.  She reports sleepy even when she increases her sleep time.  Sleep schedule time to bed 11 PM wake time 7:30 in the morning.  Gets about 6 hours of sleep and feels tired and rested.    Review of Systems  Positive for nasal congestion swollen joints chest pain rest of the 12 point review of system negative.  Porter 1 out of 24 within normal limits  Past Medical History:  Past Medical History:   Diagnosis Date   • GERD (gastroesophageal reflux disease) 3/14/2018   • History of stress test     [facility]   • Pregnancy            Past Surgical History:  Past Surgical History:   Procedure Laterality Date   • BREAST LUMPECTOMY Left 2013   • COLONOSCOPY         • TONSILLECTOMY          Home Meds:    (Not in a hospital admission)    Allergies:  No Known Allergies    Social History:   Social History     Socioeconomic History   • Marital status:      Spouse name: Not on file   • Number of children: Not  "on file   • Years of education: Not on file   • Highest education level: Not on file   Tobacco Use   • Smoking status: Never Smoker   Substance and Sexual Activity   • Alcohol use: No   • Drug use: No       Family History:  Family History   Problem Relation Age of Onset   • Diabetes Mother    • Hyperlipidemia Mother    • Pulmonary embolism Mother         after surgery   • Thyroid disease Mother    • Rheum arthritis Mother    • Diabetes Father    • Hyperlipidemia Father    • Stroke Father    • Asthma Father    • Glaucoma Father    • Colon cancer Paternal Grandfather    • Breast cancer Cousin        Physical Exam:  /80   Pulse 71   Ht 165.1 cm (65\")   Wt 77.6 kg (171 lb)   SpO2 96%   BMI 28.46 kg/m²   Body mass index is 28.46 kg/m². 96% 77.6 kg (171 lb)  Physical Exam  Middle-aged female resting comfortably no distress no labored breathing  Alert oriented x3  ENT Mallampati between 2 and 3  Neck is slender no thyromegaly no adenopathy  Chest is clear  CVs regular rate rhythm no murmurs  Abdomen is soft nontender no masses felt  Next remedies no edema  CNS no deficits  No joint deformities no skin rashes    LABS:  Lab Results   Component Value Date    CALCIUM 10.4 04/30/2019       No results found for: CKTOTAL, CKMB, CKMBINDEX, TROPONINI, TROPONINT                              Lab Results   Component Value Date    TSH 5.070 (H) 04/30/2019     Estimated Creatinine Clearance: 72.1 mL/min (by C-G formula based on SCr of 0.94 mg/dL).         Imaging: I personally visualized the images of scans/x-rays performed within last 3 days.      Assessment:  Hypersomnia  Insomnia  Asthma  Hypothyroidism      Recommendations:  At this point we have a middle-aged female with symptoms of hypersomnia with associated insomnia.  She has subtle symptoms suggest possible sleep disordered breathing.  Discussed pathophysiology consequences of untreated sleep apnea.  Patient agreeable wishing to proceed for a split-night sleep study. "  Correlation between sleep apnea and asthma was also discussed with the patient.  Ambien given for the sleep study.  Sleep hygiene measures discussed in detail.  We will follow-up and make further recommendations after reviewing the sleep study.  Her last TSH was noted to be slightly elevated she is currently on thyroid replacement  She will continue using as needed albuterol with Singulair for asthma.            Jimena Spencer MD  5/20/2019  4:31 PM      Much of this encounter note is an electronic transcription/translation of spoken language to printed text using Dragon Software.

## 2019-05-30 RX ORDER — PRAVASTATIN SODIUM 40 MG
40 TABLET ORAL DAILY
Qty: 30 TABLET | Refills: 3 | Status: SHIPPED | OUTPATIENT
Start: 2019-05-30 | End: 2019-11-13 | Stop reason: SDUPTHER

## 2019-06-05 ENCOUNTER — TELEPHONE (OUTPATIENT)
Dept: INTERNAL MEDICINE | Facility: CLINIC | Age: 54
End: 2019-06-05

## 2019-06-05 NOTE — TELEPHONE ENCOUNTER
----- Message from Kandi Sanjay sent at 6/5/2019  9:00 AM EDT -----  Contact: pt  Patient called to get a referral for a CT that Dr Wilson had originally written on a prescription pad during the patient's last office visit. The patient has had bumps on the right side of her back and groin. The Patient stated that [facility] will not take that as an order and needs a dx code to schedule the CT. Please have Dr Wilson put in the CT order.      [facility] fx # [phone]    Pt# [phone]

## 2019-06-05 NOTE — TELEPHONE ENCOUNTER
Prescription is ready, is on my desk.  Mail to patient or have patient to come and pick it up.  This is not for CT, but for the soft tissue ultrasound.  And I do not know anything about any bumps in the groin so I am confused.US is for the back

## 2019-06-09 ENCOUNTER — APPOINTMENT (OUTPATIENT)
Dept: SLEEP MEDICINE | Facility: HOSPITAL | Age: 54
End: 2019-06-09

## 2019-08-02 ENCOUNTER — LAB (OUTPATIENT)
Dept: INTERNAL MEDICINE | Facility: CLINIC | Age: 54
End: 2019-08-02

## 2019-08-02 DIAGNOSIS — E78.2 MIXED HYPERLIPIDEMIA: ICD-10-CM

## 2019-08-02 DIAGNOSIS — E03.9 PRIMARY HYPOTHYROIDISM: ICD-10-CM

## 2019-08-02 LAB
ALBUMIN SERPL-MCNC: 4.5 G/DL (ref 3.5–5.2)
ALBUMIN/GLOB SERPL: 1.3 G/DL
ALP SERPL-CCNC: 56 U/L (ref 39–117)
ALT SERPL W P-5'-P-CCNC: 17 U/L (ref 1–33)
ANION GAP SERPL CALCULATED.3IONS-SCNC: 8.3 MMOL/L (ref 5–15)
AST SERPL-CCNC: 15 U/L (ref 1–32)
BILIRUB SERPL-MCNC: 0.3 MG/DL (ref 0.2–1.2)
BUN BLD-MCNC: 12 MG/DL (ref 6–20)
BUN/CREAT SERPL: 11.3 (ref 7–25)
CALCIUM SPEC-SCNC: 9.8 MG/DL (ref 8.6–10.5)
CHLORIDE SERPL-SCNC: 103 MMOL/L (ref 98–107)
CHOLEST SERPL-MCNC: 158 MG/DL (ref 0–200)
CO2 SERPL-SCNC: 29.7 MMOL/L (ref 22–29)
CREAT BLD-MCNC: 1.06 MG/DL (ref 0.57–1)
GFR SERPL CREATININE-BSD FRML MDRD: 54 ML/MIN/1.73
GLOBULIN UR ELPH-MCNC: 3.4 GM/DL
GLUCOSE BLD-MCNC: 114 MG/DL (ref 65–99)
HDLC SERPL-MCNC: 37 MG/DL (ref 40–60)
LDLC SERPL CALC-MCNC: 86 MG/DL (ref 0–100)
LDLC/HDLC SERPL: 2.31 {RATIO}
POTASSIUM BLD-SCNC: 4.1 MMOL/L (ref 3.5–5.2)
PROT SERPL-MCNC: 7.9 G/DL (ref 6–8.5)
SODIUM BLD-SCNC: 141 MMOL/L (ref 136–145)
T4 FREE SERPL-MCNC: 1.27 NG/DL (ref 0.93–1.7)
TRIGL SERPL-MCNC: 177 MG/DL (ref 0–150)
TSH SERPL DL<=0.05 MIU/L-ACNC: 3.58 MIU/ML (ref 0.27–4.2)
VLDLC SERPL-MCNC: 35.4 MG/DL (ref 5–40)

## 2019-08-02 PROCEDURE — 84443 ASSAY THYROID STIM HORMONE: CPT | Performed by: INTERNAL MEDICINE

## 2019-08-02 PROCEDURE — 84439 ASSAY OF FREE THYROXINE: CPT | Performed by: INTERNAL MEDICINE

## 2019-08-02 PROCEDURE — 36415 COLL VENOUS BLD VENIPUNCTURE: CPT | Performed by: INTERNAL MEDICINE

## 2019-08-02 PROCEDURE — 80061 LIPID PANEL: CPT | Performed by: INTERNAL MEDICINE

## 2019-08-02 PROCEDURE — 80053 COMPREHEN METABOLIC PANEL: CPT | Performed by: INTERNAL MEDICINE

## 2019-08-13 ENCOUNTER — OFFICE VISIT (OUTPATIENT)
Dept: INTERNAL MEDICINE | Facility: CLINIC | Age: 54
End: 2019-08-13

## 2019-08-13 VITALS
DIASTOLIC BLOOD PRESSURE: 80 MMHG | SYSTOLIC BLOOD PRESSURE: 122 MMHG | RESPIRATION RATE: 14 BRPM | WEIGHT: 178 LBS | BODY MASS INDEX: 29.66 KG/M2 | HEIGHT: 65 IN

## 2019-08-13 DIAGNOSIS — E03.9 PRIMARY HYPOTHYROIDISM: ICD-10-CM

## 2019-08-13 DIAGNOSIS — B00.9 HERPES SIMPLEX TYPE 1 INFECTION: ICD-10-CM

## 2019-08-13 DIAGNOSIS — E78.00 PURE HYPERCHOLESTEROLEMIA: Primary | ICD-10-CM

## 2019-08-13 PROCEDURE — 99214 OFFICE O/P EST MOD 30 MIN: CPT | Performed by: INTERNAL MEDICINE

## 2019-08-13 RX ORDER — ACYCLOVIR 50 MG/G
OINTMENT TOPICAL
Qty: 30 G | Refills: 5 | Status: SHIPPED | OUTPATIENT
Start: 2019-08-13

## 2019-08-13 NOTE — PROGRESS NOTES
"Subjective   Nikia Malave is a 54 y.o. female.     History of Present Illness   Nikia Malave 54 y.o. female presents today for thyroid disease f/u. Last was seen on 05/20/2019 and on that visit medication was changed due to new diagnosis of hypothyroidism.We had started low dose of Levothyroxine.  Patient is compliant with treatment and reports side effects: worsening of insomnia and feeling\"hot all the time\". Had gained 7 lbs.C/o palpitations, new onset. No palpitations if she forgets to take thyroid supplem,ent.    Nikia Malave 54 y.o. female presents today for hyperlipidemia f/u. Last was seen on 05/20/2019 and on that visit medication was changed due to inadequate control.We had added Levothyroxine 50 mcg a day and continued Pravastatin 40 mg a day..  Patient is compliant with treatment .    Nikia Malave 54 y.o. female presents for evaluation of a rash involving the right paranasal area. Rash has been present for: 1 day. Lesions are erythematous and pink, and are described as purpuric, raised and apainful. Rash has changed over time. Rash is painful. Associated symptoms  .none.  Patient denies:abdominal pain, arthralgia and cough.          Symptoms are gradually worsening. Patient has not had contacts with similar rash. Patient has not had new exposures (soaps, lotions, laundry detergents, foods, medications, plants, insects or animals).         Treatment to date includes: started Zovirax poills, had similar episode in a past.       /80 (BP Location: Left arm, Patient Position: Sitting, Cuff Size: Adult)   Resp 14   Ht 165.1 cm (65\")   Wt 80.7 kg (178 lb)   BMI 29.62 kg/m²     Current Outpatient Medications:   •  acyclovir (ZOVIRAX) 400 MG tablet, TAKE 1 TABLET BY MOUTH EVERY 4 (FOUR) HOURS WHILE AWAKE. TAKE NO MORE THAN 5 DOSES A DAY., Disp: 30 tablet, Rfl: 3  •  albuterol (PROVENTIL HFA) 108 (90 Base) MCG/ACT inhaler, Inhale 2 puffs 4 (Four) Times a Day As Needed for Shortness of Air., Disp: " 1 inhaler, Rfl: 11  •  BD PEN NEEDLE YAHAIRA U/F 32G X 4 MM misc, USE AS DIRECTED FOR VICTOZA, Disp: 100 each, Rfl: 2  •  Biotin 300 MCG tablet, Take 300 mg by mouth Daily., Disp: , Rfl:   •  Cholecalciferol (VITAMIN D) 1000 units tablet, Take 4,000 Units by mouth Daily., Disp: , Rfl:   •  levothyroxine (SYNTHROID, LEVOTHROID) 50 MCG tablet, Take 1 tablet by mouth Daily., Disp: 90 tablet, Rfl: 3  •  montelukast (SINGULAIR) 10 MG tablet, Take 1 tablet by mouth Every Night., Disp: 90 tablet, Rfl: 3  •  ONE TOUCH ULTRA TEST test strip, Place 1 strip on the skin Daily., Disp: , Rfl: 3  •  pravastatin (PRAVACHOL) 40 MG tablet, TAKE 1 TABLET BY MOUTH DAILY., Disp: 30 tablet, Rfl: 3  •  VICTOZA 18 MG/3ML solution pen-injector injection, INJECT 1.2 MG UNDER THE SKIN INTO THE APPROPRIATE AREA AS DIRECTED DAILY., Disp: 6 mL, Rfl: 3  •  vitamin E 100 UNIT capsule, Take 100 Units by mouth Daily., Disp: , Rfl:   The following portions of the patient's history were reviewed and updated as appropriate: allergies, current medications, past family history, past medical history, past social history, past surgical history and problem list.    Review of Systems   Constitutional: Negative for chills and fever.   Eyes: Negative for pain and redness.   Respiratory: Negative for cough and shortness of breath.    Cardiovascular: Negative for chest pain and leg swelling.   Neurological: Negative for dizziness and headaches.       Objective   Physical Exam   Constitutional: She is oriented to person, place, and time. She appears well-developed.   overweight   HENT:   Head: Normocephalic and atraumatic.   Right Ear: Tympanic membrane, external ear and ear canal normal.   Left Ear: Tympanic membrane, external ear and ear canal normal.   Nose: Nose normal. Right sinus exhibits no maxillary sinus tenderness and no frontal sinus tenderness. Left sinus exhibits no maxillary sinus tenderness and no frontal sinus tenderness.   Mouth/Throat: Uvula is  midline, oropharynx is clear and moist and mucous membranes are normal.   Eyes: Conjunctivae and EOM are normal. Pupils are equal, round, and reactive to light. Right eye exhibits no discharge. Left eye exhibits no discharge. No scleral icterus.   Neck: Neck supple. No JVD present.   Cardiovascular: Normal rate, regular rhythm and normal heart sounds. Exam reveals no gallop and no friction rub.   No murmur heard.  Pulmonary/Chest: Effort normal and breath sounds normal. She has no wheezes. She has no rales.   Musculoskeletal: She exhibits no edema.   Lymphadenopathy:     She has no cervical adenopathy.   Neurological: She is alert and oriented to person, place, and time. No cranial nerve deficit.   Skin: Skin is warm and dry. No rash noted.   erythematous raised patch 15 x 18 mm over the right paranasal area, several discreat papules within the patch.   Psychiatric: She has a normal mood and affect. Her behavior is normal.   Vitals reviewed.      Assessment/Plan   Nikia was seen today for hyperlipidemia, hypothyroidism and rash.    Diagnoses and all orders for this visit:    Pure hypercholesterolemia  -     Comprehensive Metabolic Panel; Future  -     Lipid Panel; Future    Primary hypothyroidism  -     TSH; Future    Herpes simplex type 1 infection  -     acyclovir (ZOVIRAX) 5 % ointment; Apply  topically to the appropriate area as directed Every 3 (Three) Hours.      1. Mild hypothyroidism - all biochemistry had improved, but patient c/o palpitations and heat intolerance. Actually had gained weight. Even all of the above s-ms may be explained by menopausal changes, we will try to stop supplement and recheck in 2 months.  2. Hyperlipoidemia - LDL had improved after we had added Levothyroxine. Target LDL is below 70, patient's from 99 is down to 86, still suboptimal. Will plan to change Pravastatin to Rosuvastatin next visit if not better.  3. HSV 1 outbreak - agree with use of oral Acyclovir, also will add Zovirax  ointment.

## 2019-08-13 NOTE — PATIENT INSTRUCTIONS
1. Mild hypothyroidism - all biochemistry had improved, but patient c/o palpitations and heat intolerance. Actually had gained weight. Even all of the above s-ms may be explained by menopausal changes, we will try to stop supplement and recheck in 2 months.  2. Hyperlipoidemia - LDL had improved after we had added Levothyroxine. Target LDL is below 70, patient's from 99 is down to 86, still suboptimal. Will plan to change Pravastatin to Rosuvastatin next visit if not better.  3. HSV 1 outbreak - agree with use of oral Acyclovir, also will add Zovirax ointment.

## 2019-09-27 DIAGNOSIS — IMO0001 MODERATE INTERMITTENT ASTHMA WITHOUT COMPLICATION: ICD-10-CM

## 2019-09-27 RX ORDER — ALBUTEROL SULFATE 90 UG/1
2 AEROSOL, METERED RESPIRATORY (INHALATION) 4 TIMES DAILY PRN
Qty: 1 INHALER | Refills: 11 | Status: SHIPPED | OUTPATIENT
Start: 2019-09-27 | End: 2022-05-27 | Stop reason: SDUPTHER

## 2019-09-27 RX ORDER — MONTELUKAST SODIUM 10 MG/1
10 TABLET ORAL NIGHTLY
Qty: 90 TABLET | Refills: 3 | Status: SHIPPED | OUTPATIENT
Start: 2019-09-27

## 2019-10-23 ENCOUNTER — LAB (OUTPATIENT)
Dept: INTERNAL MEDICINE | Facility: CLINIC | Age: 54
End: 2019-10-23

## 2019-10-23 DIAGNOSIS — E78.00 PURE HYPERCHOLESTEROLEMIA: ICD-10-CM

## 2019-10-23 DIAGNOSIS — E03.9 PRIMARY HYPOTHYROIDISM: ICD-10-CM

## 2019-10-23 LAB
ALBUMIN SERPL-MCNC: 5.3 G/DL (ref 3.5–5.2)
ALBUMIN/GLOB SERPL: 1.9 G/DL
ALP SERPL-CCNC: 63 U/L (ref 39–117)
ALT SERPL-CCNC: 33 U/L (ref 1–33)
AST SERPL-CCNC: 33 U/L (ref 1–32)
BILIRUB SERPL-MCNC: 0.6 MG/DL (ref 0.2–1.2)
BUN SERPL-MCNC: 11 MG/DL (ref 6–20)
BUN/CREAT SERPL: 12.8 (ref 7–25)
CALCIUM SERPL-MCNC: 9.8 MG/DL (ref 8.6–10.5)
CHLORIDE SERPL-SCNC: 99 MMOL/L (ref 98–107)
CHOLEST SERPL-MCNC: 216 MG/DL (ref 0–200)
CO2 SERPL-SCNC: 27.5 MMOL/L (ref 22–29)
CREAT SERPL-MCNC: 0.86 MG/DL (ref 0.57–1)
GLOBULIN SER CALC-MCNC: 2.8 GM/DL
GLUCOSE SERPL-MCNC: 99 MG/DL (ref 65–99)
HDLC SERPL-MCNC: 47 MG/DL (ref 40–60)
LDLC SERPL CALC-MCNC: 128 MG/DL (ref 0–100)
POTASSIUM SERPL-SCNC: 4.9 MMOL/L (ref 3.5–5.2)
PROT SERPL-MCNC: 8.1 G/DL (ref 6–8.5)
SODIUM SERPL-SCNC: 143 MMOL/L (ref 136–145)
TRIGL SERPL-MCNC: 204 MG/DL (ref 0–150)
VLDLC SERPL-MCNC: 40.8 MG/DL

## 2019-10-24 LAB — TSH SERPL-ACNC: 5.55 UIU/ML (ref 0.27–4.2)

## 2019-10-28 ENCOUNTER — TELEPHONE (OUTPATIENT)
Dept: INTERNAL MEDICINE | Facility: CLINIC | Age: 54
End: 2019-10-28

## 2019-10-28 DIAGNOSIS — R73.03 PREDIABETES: ICD-10-CM

## 2019-10-28 DIAGNOSIS — Z00.00 HEALTH CARE MAINTENANCE: ICD-10-CM

## 2019-10-28 DIAGNOSIS — E04.2 GOITER, NONTOXIC, MULTINODULAR: Primary | ICD-10-CM

## 2019-10-28 DIAGNOSIS — E55.9 VITAMIN D DEFICIENCY: ICD-10-CM

## 2019-10-28 NOTE — TELEPHONE ENCOUNTER
You could tell patient that your blood sugar is still elevated.  As we had discontinued thyroid supplement, her cholesterol is also elevated.  She does need to change her diet and work on weight loss.  Please schedule physical after May 8, 2020.  She will need fasting blood test before that appointment.  Okay to cancel appointment in December.

## 2019-10-28 NOTE — TELEPHONE ENCOUNTER
Patient has been erroneously marked as diabetic. Based on the available clinical information, she does not have diabetes and should therefore be excluded from diabetic health maintenance and quality measures for the remainder of the reporting period.

## 2019-10-28 NOTE — TELEPHONE ENCOUNTER
PATIENT WAS CALLING TO RESCHEDULE APPOINTMENT AND WAS HOPING SHE WOULDN'T ACTUALLY NEED TO KEEP THE APPOINTMENT IF SHE COULD JUST BE TOLD HER RESULTS OVER THE PHONE , PT DID RESCHEDULE HER APPT FOR December

## 2019-10-28 NOTE — TELEPHONE ENCOUNTER
Left patient very detailed message, notified she needs to schedule as physical as mentioned by Dr Wilson. December appointment has been cancelled.

## 2019-11-13 RX ORDER — PRAVASTATIN SODIUM 40 MG
40 TABLET ORAL DAILY
Qty: 90 TABLET | Refills: 3 | Status: SHIPPED | OUTPATIENT
Start: 2019-11-13

## 2019-11-18 ENCOUNTER — TELEPHONE (OUTPATIENT)
Dept: INTERNAL MEDICINE | Facility: CLINIC | Age: 54
End: 2019-11-18

## 2019-11-18 NOTE — TELEPHONE ENCOUNTER
Pt called stating that she has had wheezing on the left side and coughing since about  11/10 and would like to know if a breathing treatment or some medication can be ordered?    [facility]

## 2019-11-19 ENCOUNTER — OFFICE VISIT (OUTPATIENT)
Dept: INTERNAL MEDICINE | Facility: CLINIC | Age: 54
End: 2019-11-19

## 2019-11-19 VITALS
TEMPERATURE: 97.9 F | DIASTOLIC BLOOD PRESSURE: 84 MMHG | BODY MASS INDEX: 28.66 KG/M2 | SYSTOLIC BLOOD PRESSURE: 130 MMHG | HEIGHT: 65 IN | HEART RATE: 70 BPM | WEIGHT: 172 LBS | OXYGEN SATURATION: 97 % | RESPIRATION RATE: 18 BRPM

## 2019-11-19 DIAGNOSIS — R50.9 FEVER, UNSPECIFIED FEVER CAUSE: Primary | ICD-10-CM

## 2019-11-19 DIAGNOSIS — R05.9 COUGH: ICD-10-CM

## 2019-11-19 DIAGNOSIS — J40 BRONCHITIS: ICD-10-CM

## 2019-11-19 PROCEDURE — 99213 OFFICE O/P EST LOW 20 MIN: CPT | Performed by: NURSE PRACTITIONER

## 2019-11-19 RX ORDER — GUAIFENESIN 600 MG/1
600 TABLET, EXTENDED RELEASE ORAL 2 TIMES DAILY
Qty: 14 TABLET | Refills: 0
Start: 2019-11-19 | End: 2019-11-26

## 2019-11-19 RX ORDER — BENZONATATE 200 MG/1
200 CAPSULE ORAL 3 TIMES DAILY PRN
Qty: 30 CAPSULE | Refills: 0 | Status: SHIPPED | OUTPATIENT
Start: 2019-11-19 | End: 2020-10-08

## 2019-11-19 RX ORDER — AZITHROMYCIN 250 MG/1
TABLET, FILM COATED ORAL
Qty: 6 TABLET | Refills: 0 | Status: SHIPPED | OUTPATIENT
Start: 2019-11-19 | End: 2020-10-08

## 2019-11-19 RX ORDER — METHYLPREDNISOLONE 4 MG/1
TABLET ORAL
Qty: 21 TABLET | Refills: 0 | Status: SHIPPED | OUTPATIENT
Start: 2019-11-19 | End: 2020-10-08

## 2019-11-19 NOTE — PATIENT INSTRUCTIONS
Acute Bronchitis, Adult  Acute bronchitis is when air tubes (bronchi) in the lungs suddenly get swollen. The condition can make it hard to breathe. It can also cause these symptoms:  · A cough.  · Coughing up clear, yellow, or green mucus.  · Wheezing.  · Chest congestion.  · Shortness of breath.  · A fever.  · Body aches.  · Chills.  · A sore throat.  Follow these instructions at home:    Medicines  · Take over-the-counter and prescription medicines only as told by your doctor.  · If you were prescribed an antibiotic medicine, take it as told by your doctor. Do not stop taking the antibiotic even if you start to feel better.  General instructions  · Rest.  · Drink enough fluids to keep your pee (urine) pale yellow.  · Avoid smoking and secondhand smoke. If you smoke and you need help quitting, ask your doctor. Quitting will help your lungs heal faster.  · Use an inhaler, cool mist vaporizer, or humidifier as told by your doctor.  · Keep all follow-up visits as told by your doctor. This is important.  How is this prevented?  To lower your risk of getting this condition again:  · Wash your hands often with soap and water. If you cannot use soap and water, use hand .  · Avoid contact with people who have cold symptoms.  · Try not to touch your hands to your mouth, nose, or eyes.  · Make sure to get the flu shot every year.  Contact a doctor if:  · Your symptoms do not get better in 2 weeks.  Get help right away if:  · You cough up blood.  · You have chest pain.  · You have very bad shortness of breath.  · You become dehydrated.  · You faint (pass out) or keep feeling like you are going to pass out.  · You keep throwing up (vomiting).  · You have a very bad headache.  · Your fever or chills gets worse.  This information is not intended to replace advice given to you by your health care provider. Make sure you discuss any questions you have with your health care provider.  Document Released: 06/05/2009 Document  Revised: 08/01/2018 Document Reviewed: 06/07/2017  [employer] Patient Education © 2019 [employer]

## 2019-11-19 NOTE — PROGRESS NOTES
Subjective   Nikia Malave is a 54 y.o. female.     No recent air travel. She did receive her flu shot. She reports last week she had felt feverish for several days. She has had a persistent cough and some wheezing. She is using albuterol frequently, every hour. She is scheduled for upcoming trip to [facility] this Friday.       Fever    This is a new problem. The current episode started 1 to 4 weeks ago. Associated symptoms include congestion, coughing, a sore throat and wheezing. Pertinent negatives include no abdominal pain, chest pain, diarrhea, ear pain, headaches, nausea or vomiting.   Cough   This is a new problem. The current episode started 1 to 4 weeks ago. The problem has been unchanged. The cough is productive of sputum. Associated symptoms include a fever (felt feverish, but didn't check ), postnasal drip, rhinorrhea, a sore throat and wheezing. Pertinent negatives include no chest pain, chills, ear pain, headaches or shortness of breath. Treatments tried: albuterol , dayquil, nyquil. excedrin  Her past medical history is significant for asthma, bronchitis and environmental allergies. There is no history of pneumonia.        The following portions of the patient's history were reviewed and updated as appropriate: allergies, current medications, past family history, past medical history, past social history, past surgical history and problem list.    Review of Systems   Constitutional: Positive for fever (felt feverish, but didn't check ). Negative for appetite change, chills and fatigue.   HENT: Positive for congestion, postnasal drip, rhinorrhea and sore throat. Negative for ear discharge, ear pain, facial swelling, hearing loss, sinus pressure, sneezing and tinnitus.    Respiratory: Positive for cough, chest tightness and wheezing. Negative for shortness of breath.    Cardiovascular: Negative for chest pain, palpitations and leg swelling.   Gastrointestinal: Negative for abdominal pain, diarrhea, nausea  and vomiting.   Musculoskeletal: Negative for neck pain and neck stiffness.   Allergic/Immunologic: Positive for environmental allergies.   Neurological: Negative for headaches.   Hematological: Negative for adenopathy.       Objective   Physical Exam   Constitutional: She appears well-developed and well-nourished. She is cooperative. She does not have a sickly appearance. She does not appear ill.   HENT:   Head: Normocephalic.   Right Ear: Hearing and external ear normal. No drainage, swelling or tenderness. No mastoid tenderness. Tympanic membrane is bulging. Tympanic membrane is not injected, not scarred and not erythematous. Tympanic membrane mobility is normal. No middle ear effusion. No decreased hearing is noted.   Left Ear: Hearing and external ear normal. No drainage, swelling or tenderness. No mastoid tenderness. Tympanic membrane is bulging. Tympanic membrane is not injected, not scarred and not erythematous. Tympanic membrane mobility is normal.  No middle ear effusion. No decreased hearing is noted.   Nose: Mucosal edema and rhinorrhea present. No sinus tenderness or nasal deformity. Right sinus exhibits no maxillary sinus tenderness and no frontal sinus tenderness. Left sinus exhibits no maxillary sinus tenderness and no frontal sinus tenderness.   Mouth/Throat: Mucous membranes are normal. Normal dentition. Posterior oropharyngeal erythema present. No tonsillar exudate.   Eyes: Conjunctivae and lids are normal. Pupils are equal, round, and reactive to light. Right eye exhibits no discharge and no exudate. Left eye exhibits no discharge and no exudate.   Neck: Trachea normal and normal range of motion. No edema present. No thyroid mass and no thyromegaly present.   Cardiovascular: Regular rhythm, normal heart sounds and normal pulses.   No murmur heard.  Pulmonary/Chest: Breath sounds normal. No respiratory distress. She has no decreased breath sounds. She has no wheezes. She has no rhonchi. She has no  rales.   Cough    Lymphadenopathy:        Head (right side): No submental, no submandibular, no tonsillar, no preauricular, no posterior auricular and no occipital adenopathy present.        Head (left side): No submental, no submandibular, no tonsillar, no preauricular, no posterior auricular and no occipital adenopathy present.   Neurological: She is alert.   Skin: Skin is warm, dry and intact. No cyanosis. Nails show no clubbing.       Assessment/Plan   Nikia was seen today for fever and cough.    Diagnoses and all orders for this visit:    Fever, unspecified fever cause    Cough  -     benzonatate (TESSALON) 200 MG capsule; Take 1 capsule by mouth 3 (Three) Times a Day As Needed for Cough.    Bronchitis  -     azithromycin (ZITHROMAX Z-NIRMAL) 250 MG tablet; Take 2 tablets the first day, then 1 tablet daily for 4 days.  -     mometasone-formoterol (DULERA 100) 100-5 MCG/ACT inhaler; Inhale 2 puffs 2 (Two) Times a Day. Rinse mouth with water after each use  -     guaiFENesin (MUCINEX) 600 MG 12 hr tablet; Take 1 tablet by mouth 2 (Two) Times a Day for 7 days.  -     methylPREDNISolone (MEDROL) 4 MG tablet; follow package directions    She will return to clinic for worsening of symptoms. She was advised to low carb/sugar diet with start of oral steroids.

## 2019-12-30 ENCOUNTER — TELEPHONE (OUTPATIENT)
Dept: INTERNAL MEDICINE | Facility: CLINIC | Age: 54
End: 2019-12-30

## 2019-12-30 DIAGNOSIS — E11.9 CONTROLLED TYPE 2 DIABETES MELLITUS WITHOUT COMPLICATION, WITHOUT LONG-TERM CURRENT USE OF INSULIN (HCC): ICD-10-CM

## 2019-12-30 NOTE — TELEPHONE ENCOUNTER
----- Message from Jennyfer Sanchez sent at 12/30/2019  1:30 PM EST -----  Contact: jason with pharm  Jason with [facility] calling to request refill due to time and formulary changes    FOR  VICTOZA 18 MG/3ML solution pen-injector injection      Patient requests RX SENT TO   [facility] - [address] - [phone] [facility] [phone] FX      Caller# [phone]    Please and thank you.

## 2020-03-03 RX ORDER — ACYCLOVIR 400 MG/1
400 TABLET ORAL
Qty: 30 TABLET | Refills: 3 | Status: SHIPPED | OUTPATIENT
Start: 2020-03-03 | End: 2020-05-21 | Stop reason: SDUPTHER

## 2020-03-18 ENCOUNTER — TELEPHONE (OUTPATIENT)
Dept: INTERNAL MEDICINE | Facility: CLINIC | Age: 55
End: 2020-03-18

## 2020-03-18 NOTE — TELEPHONE ENCOUNTER
PATIENT CALLING, STATES SHE WAS EXPOSED TO THE FLU AND WOULD LIKE MEDICATION CALLED IN. PATIENT IS HAVING NO SYMPTOMS.    VERIFIED [facility].    PATIENT CALL BACK [phone] TO BE ADVISED.

## 2020-03-19 ENCOUNTER — TELEPHONE (OUTPATIENT)
Dept: INTERNAL MEDICINE | Facility: CLINIC | Age: 55
End: 2020-03-19

## 2020-03-19 RX ORDER — OSELTAMIVIR PHOSPHATE 75 MG/1
75 CAPSULE ORAL DAILY
Qty: 7 CAPSULE | Refills: 0 | Status: SHIPPED | OUTPATIENT
Start: 2020-03-19 | End: 2020-10-08

## 2020-03-19 RX ORDER — OSELTAMIVIR PHOSPHATE 75 MG/1
75 CAPSULE ORAL DAILY
Qty: 7 CAPSULE | Refills: 0 | Status: SHIPPED | OUTPATIENT
Start: 2020-03-19 | End: 2020-03-19 | Stop reason: SDUPTHER

## 2020-03-19 NOTE — TELEPHONE ENCOUNTER
[facility] for prevention will take 1 pill a day.  Just let patient know that this medication has absolutely no effect on coronavirus.

## 2020-05-14 NOTE — TELEPHONE ENCOUNTER
This patient has diabetes, but had not seen me in a very long time, and had not had a blood test since October 2019.  I have no idea what dose of Ozempic that should take.  She needs annual wellness visit with fasting blood test or at least office visit for your diabetes.  Needs fasting blood work.

## 2020-05-15 ENCOUNTER — TELEPHONE (OUTPATIENT)
Dept: INTERNAL MEDICINE | Facility: CLINIC | Age: 55
End: 2020-05-15

## 2020-05-19 RX ORDER — SEMAGLUTIDE 1.34 MG/ML
INJECTION, SOLUTION SUBCUTANEOUS
OUTPATIENT
Start: 2020-05-19

## 2020-05-21 RX ORDER — ACYCLOVIR 400 MG/1
400 TABLET ORAL
Qty: 30 TABLET | Refills: 3 | Status: SHIPPED | OUTPATIENT
Start: 2020-05-21

## 2020-10-08 ENCOUNTER — APPOINTMENT (OUTPATIENT)
Dept: WOMENS IMAGING | Facility: HOSPITAL | Age: 55
End: 2020-10-08

## 2020-10-08 ENCOUNTER — OFFICE VISIT (OUTPATIENT)
Dept: INTERNAL MEDICINE | Facility: CLINIC | Age: 55
End: 2020-10-08

## 2020-10-08 VITALS
SYSTOLIC BLOOD PRESSURE: 126 MMHG | DIASTOLIC BLOOD PRESSURE: 70 MMHG | BODY MASS INDEX: 27.99 KG/M2 | HEIGHT: 65 IN | WEIGHT: 168 LBS | OXYGEN SATURATION: 97 % | TEMPERATURE: 97.6 F | HEART RATE: 84 BPM

## 2020-10-08 DIAGNOSIS — E03.9 PRIMARY HYPOTHYROIDISM: ICD-10-CM

## 2020-10-08 DIAGNOSIS — E55.9 VITAMIN D DEFICIENCY: ICD-10-CM

## 2020-10-08 DIAGNOSIS — M54.16 LUMBAR RADICULOPATHY: ICD-10-CM

## 2020-10-08 DIAGNOSIS — Z11.59 NEED FOR HEPATITIS C SCREENING TEST: Primary | ICD-10-CM

## 2020-10-08 DIAGNOSIS — B00.9 HERPES SIMPLEX TYPE 1 INFECTION: ICD-10-CM

## 2020-10-08 DIAGNOSIS — R73.03 PREDIABETES: ICD-10-CM

## 2020-10-08 DIAGNOSIS — E78.2 MIXED HYPERLIPIDEMIA: ICD-10-CM

## 2020-10-08 PROCEDURE — 72110 X-RAY EXAM L-2 SPINE 4/>VWS: CPT | Performed by: NURSE PRACTITIONER

## 2020-10-08 PROCEDURE — 72110 X-RAY EXAM L-2 SPINE 4/>VWS: CPT | Performed by: RADIOLOGY

## 2020-10-08 PROCEDURE — 99214 OFFICE O/P EST MOD 30 MIN: CPT | Performed by: NURSE PRACTITIONER

## 2020-10-08 NOTE — ASSESSMENT & PLAN NOTE
Stable   Check lab for ongoing therapeutic drug monitoring     States she has not taken consistently.

## 2020-10-08 NOTE — ASSESSMENT & PLAN NOTE
Lipid abnormalities are improving with treatment.  Pharmacotherapy as ordered.  Lipids will be reassessed in 6 months.  If LDL not at goal consider changing to Crestor.

## 2020-10-08 NOTE — PROGRESS NOTES
Name: Nikia Malave  :  1965    Subjective:      Chief Complaint   Patient presents with   • Establish Care   • Hyperlipidemia   • Numbness in right leg        Nikia Malave is a 55 y.o. female patient.  She has multiple chronic medical conditions including: Hypothyroid, hyperlipidemia, Vit D def., mild asthma.     She is a prior patient of Dr. Mo who is no longer at this practice.  She is here to establish care with me and is new to me.    LV with Dr Wilson 2020  HSV 1 outbreak under nose which was treated with Zovirax. Resolved.   This is recurrent.  She continues acyclovir for suppression.    She complains of some right leg numbness tingling.  States is been going on for 8 weeks.  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  States she has some PT and it helps some.  It's worse when she has prolonged sitting.  No injury.  No muscle weakness.  She also complains of some paresthesia on fingers and wrist.  Denies any neck pain.  No loss of strength.    Hypothyroidism  This is a chronic problem. The current episode started more than 1 year ago. The problem has been unchanged. Associated symptoms include numbness. Pertinent negatives include no abdominal pain, chest pain, chills, fatigue, fever, headaches, myalgias, neck pain or weakness. Treatments tried: synthroid - stopped.  Said it made her feel horrible.    Hyperlipidemia  This is a chronic problem. The current episode started more than 1 year ago. The problem is controlled. Exacerbating diseases include hypothyroidism. Pertinent negatives include no chest pain, myalgias or shortness of breath. Current antihyperlipidemic treatment includes statins. The current treatment provides moderate improvement of lipids. There are no compliance problems.    Diabetes  She has type 2 diabetes mellitus. The initial diagnosis of diabetes was made 2 years ago. There are no hypoglycemic associated symptoms. Pertinent negatives for  hypoglycemia include no headaches or tremors. There are no diabetic associated symptoms. Pertinent negatives for diabetes include no chest pain, no fatigue, no polydipsia, no polyphagia and no weakness. There are no hypoglycemic complications. Symptoms are stable. There are no diabetic complications. Risk factors for coronary artery disease include dyslipidemia and sedentary lifestyle. Current diabetic treatments: victoza ( states ozempic and metformin in the past - not sure why stopped metformin.  off ozempic due to insurance)     Vitamin D deficiency  This is a chronic problem.  She states she has been on vitamin D but does not take it regularly every day.    Health Maintenance:   mammorgram scheudled with Dr Bravo VERA have reviewed the patient's medical history in detail and updated the computerized patient record.    Past Medical History:   Diagnosis Date   • GERD (gastroesophageal reflux disease) 3/14/2018   • History of stress test     [facility]   • Pregnancy            Past Surgical History:   Procedure Laterality Date   • BREAST LUMPECTOMY Left 2013   • COLONOSCOPY         • TONSILLECTOMY         Family History   Problem Relation Age of Onset   • Diabetes Mother    • Hyperlipidemia Mother    • Pulmonary embolism Mother         after surgery   • Thyroid disease Mother    • Rheum arthritis Mother    • Diabetes Father    • Hyperlipidemia Father    • Stroke Father    • Asthma Father    • Glaucoma Father    • Colon cancer Paternal Grandfather    • Breast cancer Cousin        Social History     Socioeconomic History   • Marital status:      Spouse name: Not on file   • Number of children: Not on file   • Years of education: Not on file   • Highest education level: Not on file   Tobacco Use   • Smoking status: Never Smoker   • Smokeless tobacco: Never Used   Substance and Sexual Activity   • Alcohol use: No   • Drug use: No       Most Recent Immunizations   Administered Date(s)  Administered   • Shingrix 09/21/2020   • Tdap 03/14/2018         Review of Systems:   Review of Systems   Constitutional: Negative for chills, fatigue, fever and unexpected weight change.   Eyes: Negative for visual disturbance.   Respiratory: Negative for shortness of breath.    Cardiovascular: Negative for chest pain and leg swelling.   Gastrointestinal: Negative for abdominal pain and blood in stool.   Endocrine: Negative for polydipsia and polyphagia.   Genitourinary: Negative for difficulty urinating.   Musculoskeletal: Negative for gait problem, myalgias, neck pain and neck stiffness.   Allergic/Immunologic: Positive for environmental allergies.   Neurological: Positive for numbness. Negative for tremors, weakness and headaches.         Objective:      Physical Exam:   Physical Exam  Vitals signs reviewed.   Constitutional:       Appearance: Normal appearance. She is well-developed.   HENT:      Head: Normocephalic.      Comments: Wearing mask due to COVID   Eyes:      Conjunctiva/sclera: Conjunctivae normal.      Pupils: Pupils are equal, round, and reactive to light.   Neck:      Musculoskeletal: Full passive range of motion without pain, normal range of motion and neck supple. Normal range of motion. No erythema, crepitus, spinous process tenderness or muscular tenderness.      Thyroid: No thyromegaly.   Cardiovascular:      Rate and Rhythm: Normal rate and regular rhythm.      Pulses: Normal pulses.      Heart sounds: Normal heart sounds.   Pulmonary:      Effort: Pulmonary effort is normal.      Breath sounds: Normal breath sounds.      Comments: E/U   Abdominal:      General: Bowel sounds are normal.      Palpations: Abdomen is soft.   Musculoskeletal: Normal range of motion.         General: No swelling.      Lumbar back: She exhibits normal range of motion, no tenderness, no swelling, no deformity, no pain and no spasm.   Lymphadenopathy:      Cervical: No cervical adenopathy.   Skin:     General: Skin  "is warm and dry.      Capillary Refill: Capillary refill takes 2 to 3 seconds.   Neurological:      General: No focal deficit present.      Mental Status: She is alert and oriented to person, place, and time.      Sensory: Sensation is intact. No sensory deficit.      Motor: Motor function is intact. No weakness.      Gait: Gait normal.      Comments:  equal   Psychiatric:         Behavior: Behavior normal. Behavior is cooperative.         Thought Content: Thought content normal.         Judgment: Judgment normal.           Vital Signs:  Vitals:    10/08/20 0804   BP: 126/70   BP Location: Left arm   Patient Position: Sitting   Cuff Size: Adult   Pulse: 84   Temp: 97.6 °F (36.4 °C)   TempSrc: Oral   SpO2: 97%   Weight: 76.2 kg (168 lb)   Height: 165.1 cm (65\")     Body mass index is 27.96 kg/m².      Results Review:      REVIEWED AND DISCUSSED LAB RESULTS WITH PATIENT      Requested Prescriptions      No prescriptions requested or ordered in this encounter     Routine medications provided by this office will also be refilled via pharmacy request.       Current Outpatient Medications:   •  acyclovir (ZOVIRAX) 400 MG tablet, Take 1 tablet by mouth Every 4 (Four) Hours While Awake. Take no more than 5 doses a day., Disp: 30 tablet, Rfl: 3  •  acyclovir (ZOVIRAX) 5 % ointment, Apply  topically to the appropriate area as directed Every 3 (Three) Hours., Disp: 30 g, Rfl: 5  •  albuterol sulfate HFA (PROVENTIL HFA) 108 (90 Base) MCG/ACT inhaler, Inhale 2 puffs 4 (Four) Times a Day As Needed for Shortness of Air., Disp: 1 inhaler, Rfl: 11  •  BD PEN NEEDLE YAHAIRA U/F 32G X 4 MM misc, USE AS DIRECTED FOR VICTOZA, Disp: 100 each, Rfl: 2  •  Biotin 300 MCG tablet, Take 300 mg by mouth Daily., Disp: , Rfl:   •  Cholecalciferol (VITAMIN D) 1000 units tablet, Take 4,000 Units by mouth Daily., Disp: , Rfl:   •  Liraglutide (VICTOZA) 18 MG/3ML solution pen-injector injection, Inject 1.2 mg under the skin into the appropriate " area as directed Daily., Disp: 6 mL, Rfl: 3  •  mometasone-formoterol (DULERA 100) 100-5 MCG/ACT inhaler, Inhale 2 puffs 2 (Two) Times a Day. Rinse mouth with water after each use, Disp: 8.8 g, Rfl: 0  •  montelukast (SINGULAIR) 10 MG tablet, Take 1 tablet by mouth Every Night., Disp: 90 tablet, Rfl: 3  •  ONE TOUCH ULTRA TEST test strip, Place 1 strip on the skin Daily., Disp: , Rfl: 3  •  pravastatin (PRAVACHOL) 40 MG tablet, Take 1 tablet by mouth Daily., Disp: 90 tablet, Rfl: 3  •  progesterone (PROMETRIUM) 200 MG capsule, , Disp: , Rfl:   •  vitamin E 100 UNIT capsule, Take 100 Units by mouth Daily., Disp: , Rfl:        Assessment and Plan:        Problem List Items Addressed This Visit        Cardiovascular and Mediastinum    HLD (hyperlipidemia)     Lipid abnormalities are improving with treatment.  Pharmacotherapy as ordered.  Lipids will be reassessed in 6 months.  If LDL not at goal consider changing to Crestor.         Relevant Orders    Comprehensive Metabolic Panel    Lipid Panel With LDL / HDL Ratio    CBC (No Diff)       Digestive    Vitamin D deficiency     Stable   Check lab for ongoing therapeutic drug monitoring     States she has not taken consistently.         Relevant Orders    Vitamin D 1,25 Dihydroxy       Endocrine    Prediabetes     Stable   Check lab for ongoing therapeutic drug monitoring     Unclear why she was not on metformin.          Relevant Orders    Hemoglobin A1c    Microalbumin / Creatinine Urine Ratio - Urine, Clean Catch       Other    Herpes simplex type 1 infection     Stable with chronic immunosuppression.  She only takes Zovirax x 3 times a week         Primary hypothyroidism     No longer taking Synthroid  We will check TSH and free T4 today         Relevant Orders    TSH    T4, free      Other Visit Diagnoses     Need for hepatitis C screening test    -  Primary    Relevant Orders    Hepatitis C Antibody    Lumbar radiculopathy        Likely from having a desk job & not  "having regular range of motion.  We will get lumbar x-ray to make sure no disc issues  Continue with exercise & weight loss    Relevant Orders    Vitamin B12    XR Spine Lumbar 4+ View (In Office) (Completed)           We will check vitamin B level due to her tingling in her hands.  She has no complaints in the cervical area.  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    Discussed any change in Rx and discussed visit with patient.  All questions answered.      Return in about 6 months (around 4/8/2021) for Annual physical.    Cruz \"Sanya\" Shahbaz, MARLEEN   10/08/20    Dragon disclaimer:   Much of this encounter note is an electronic transcription/translation of spoken language to printed text. The electronic translation of spoken language may permit erroneous, or at times, nonsensical words or phrases to be inadvertently transcribed; Although I have reviewed the note for such errors, some may still exist.     Additional Patient Counseling:       Patient Instructions       As a Diabetic, you need a Diabetic Eye Exam YEARLY.  Please have your provider to send a copy of the note to our office.  Fax: [phone]    Your last A1C was   Lab Results   Component Value Date    HGBA1C 5.70 (H) 04/30/2019       This is a 3 month average of your blood sugar.  Your goal is to be LESS than 7%.           October is Breast Cancer Awareness Month  Each year more than 245,000 women get breast cancer in the [place].  Breast cancer in men is less frequent but you should check yourself at regular intervals.    Monitor your breast for changes  • Any change in the size or the shape of the breast  • Pain in any area of the breast  • Nipple discharge other than breast milk (including blood)  • A new lump in the breast or underarm  *Continue regular scheduled mammograms    Diet:    • Eat vegetables, fruits, whole grain, low-fat dairy, poultry, fish, beans, nontropical vegetable oils, and nuts, but low " amounts of red meat (i.e., Mediterranean-style diet, DASH [Dietary Approaches to Stop Hypertension] diet).  • Limit sugary drinks and sweets.  • Limit saturated and trans fat to 5% to 6% of calories.  • Limit sodium intake to 2,400 mg daily (about one teaspoon table salt [kosher/sea salt have less sodium per teaspoon]).  • Fad diets will come and go.  Studies show that the most effective diet is one that you can continue long term.     Weight loss / Calorie Counting Apps:    • Lose It!   • [employer] Pal   • Works great when you try it with a partner/ friend    Exercise:   • Engage in moderate-to-vigorous aerobic activity for at least 40 minutes (on average) three to four times each week.    Wearables:   • Activity tracker   • Step tracker: getting 7,500 steps daily can cut your cardiac risks by 44%     Bone Health:   • [email or web address]/[email or web address]/[email or web address]  • Routine weight bearing exercise    Vaccines:   • Flu vaccine every fall  • [email or web address].[email or web address]

## 2020-10-08 NOTE — ASSESSMENT & PLAN NOTE
Stable   Check lab for ongoing therapeutic drug monitoring     Unclear why she was not on metformin.

## 2020-10-08 NOTE — PATIENT INSTRUCTIONS
As a Diabetic, you need a Diabetic Eye Exam YEARLY.  Please have your provider to send a copy of the note to our office.  Fax: [phone]    Your last A1C was   Lab Results   Component Value Date    HGBA1C 5.70 (H) 04/30/2019       This is a 3 month average of your blood sugar.  Your goal is to be LESS than 7%.           October is Breast Cancer Awareness Month  Each year more than 245,000 women get breast cancer in the [place].  Breast cancer in men is less frequent but you should check yourself at regular intervals.    Monitor your breast for changes  • Any change in the size or the shape of the breast  • Pain in any area of the breast  • Nipple discharge other than breast milk (including blood)  • A new lump in the breast or underarm  *Continue regular scheduled mammograms    Diet:    • Eat vegetables, fruits, whole grain, low-fat dairy, poultry, fish, beans, nontropical vegetable oils, and nuts, but low amounts of red meat (i.e., Mediterranean-style diet, DASH [Dietary Approaches to Stop Hypertension] diet).  • Limit sugary drinks and sweets.  • Limit saturated and trans fat to 5% to 6% of calories.  • Limit sodium intake to 2,400 mg daily (about one teaspoon table salt [kosher/sea salt have less sodium per teaspoon]).  • Fad diets will come and go.  Studies show that the most effective diet is one that you can continue long term.     Weight loss / Calorie Counting Apps:    • Lose It!   • My[employer]itness Pal   • Works great when you try it with a partner/ friend    Exercise:   • Engage in moderate-to-vigorous aerobic activity for at least 40 minutes (on average) three to four times each week.    Wearables:   • Activity tracker   • Step tracker: getting 7,500 steps daily can cut your cardiac risks by 44%     Bone Health:   • [email or web address]/patients/treatment/[email or web address]  • Routine weight bearing exercise    Vaccines:   • Flu vaccine every fall  • https://www.[email or web address]

## 2020-10-12 LAB
1,25(OH)2D3 SERPL-MCNC: 45 PG/ML (ref 19.9–79.3)
ALBUMIN SERPL-MCNC: 4.9 G/DL (ref 3.5–5.2)
ALBUMIN/CREAT UR: 6 MG/G CREAT (ref 0–29)
ALBUMIN/GLOB SERPL: 2.1 G/DL
ALP SERPL-CCNC: 53 U/L (ref 39–117)
ALT SERPL-CCNC: 15 U/L (ref 1–33)
AST SERPL-CCNC: 15 U/L (ref 1–32)
BILIRUB SERPL-MCNC: 0.3 MG/DL (ref 0–1.2)
BUN SERPL-MCNC: 17 MG/DL (ref 6–20)
BUN/CREAT SERPL: 20.5 (ref 7–25)
CALCIUM SERPL-MCNC: 9.2 MG/DL (ref 8.6–10.5)
CHLORIDE SERPL-SCNC: 106 MMOL/L (ref 98–107)
CHOLEST SERPL-MCNC: 159 MG/DL (ref 0–200)
CO2 SERPL-SCNC: 26.2 MMOL/L (ref 22–29)
CREAT SERPL-MCNC: 0.83 MG/DL (ref 0.57–1)
CREAT UR-MCNC: 169.8 MG/DL
ERYTHROCYTE [DISTWIDTH] IN BLOOD BY AUTOMATED COUNT: 13.4 % (ref 12.3–15.4)
GLOBULIN SER CALC-MCNC: 2.3 GM/DL
GLUCOSE SERPL-MCNC: 109 MG/DL (ref 65–99)
HBA1C MFR BLD: 5.6 % (ref 4.8–5.6)
HCT VFR BLD AUTO: 41.8 % (ref 34–46.6)
HCV AB S/CO SERPL IA: 0.1 S/CO RATIO (ref 0–0.9)
HDLC SERPL-MCNC: 43 MG/DL (ref 40–60)
HGB BLD-MCNC: 14.5 G/DL (ref 12–15.9)
LDLC SERPL CALC-MCNC: 88 MG/DL (ref 0–100)
LDLC/HDLC SERPL: 1.94 {RATIO}
MCH RBC QN AUTO: 32.2 PG (ref 26.6–33)
MCHC RBC AUTO-ENTMCNC: 34.7 G/DL (ref 31.5–35.7)
MCV RBC AUTO: 92.7 FL (ref 79–97)
MICROALBUMIN UR-MCNC: 10.9 UG/ML
PLATELET # BLD AUTO: 194 10*3/MM3 (ref 140–450)
POTASSIUM SERPL-SCNC: 4.6 MMOL/L (ref 3.5–5.2)
PROT SERPL-MCNC: 7.2 G/DL (ref 6–8.5)
RBC # BLD AUTO: 4.51 10*6/MM3 (ref 3.77–5.28)
SODIUM SERPL-SCNC: 141 MMOL/L (ref 136–145)
T4 FREE SERPL-MCNC: 1.25 NG/DL (ref 0.93–1.7)
TRIGL SERPL-MCNC: 162 MG/DL (ref 0–150)
TSH SERPL DL<=0.005 MIU/L-ACNC: 4.88 UIU/ML (ref 0.27–4.2)
VIT B12 SERPL-MCNC: 568 PG/ML (ref 211–946)
VLDLC SERPL CALC-MCNC: 28 MG/DL (ref 5–40)
WBC # BLD AUTO: 6.98 10*3/MM3 (ref 3.4–10.8)

## 2020-10-13 NOTE — PROGRESS NOTES
Please call and notify her - x-ray showed mild degenerative changes of lumbar spine.  If pain down legs continues, can refer to PT.   IF more than 6 months or develops weakness, can get a MRI.   Lab work looked good.  Watch sugar and carbohydrate intake.  Vit d level is good on current dose.     WCN

## 2022-05-27 DIAGNOSIS — J45.909 ASTHMA, UNSPECIFIED ASTHMA SEVERITY, UNSPECIFIED WHETHER COMPLICATED, UNSPECIFIED WHETHER PERSISTENT: ICD-10-CM

## 2022-05-27 RX ORDER — ALBUTEROL SULFATE 90 UG/1
2 AEROSOL, METERED RESPIRATORY (INHALATION) 4 TIMES DAILY PRN
Qty: 8 G | Refills: 5 | Status: SHIPPED | OUTPATIENT
Start: 2022-05-27